# Patient Record
Sex: FEMALE | Race: WHITE | Employment: UNEMPLOYED | URBAN - METROPOLITAN AREA
[De-identification: names, ages, dates, MRNs, and addresses within clinical notes are randomized per-mention and may not be internally consistent; named-entity substitution may affect disease eponyms.]

---

## 2022-12-05 ENCOUNTER — HOSPITAL ENCOUNTER (OUTPATIENT)
Dept: WOMENS IMAGING | Age: 72
Discharge: HOME | End: 2022-12-05

## 2023-07-25 PROBLEM — R06.02 SHORT OF BREATH ON EXERTION: Status: ACTIVE | Noted: 2023-07-25

## 2023-07-25 PROBLEM — M75.41 IMPINGEMENT SYNDROME OF RIGHT SHOULDER: Status: ACTIVE | Noted: 2023-07-25

## 2023-07-25 PROBLEM — M19.90 DJD (DEGENERATIVE JOINT DISEASE): Status: ACTIVE | Noted: 2023-07-25

## 2023-07-25 PROBLEM — D22.9 BENIGN MOLE: Status: ACTIVE | Noted: 2023-07-25

## 2023-07-25 PROBLEM — I21.9 MYOCARDIAL INFARCTION (MULTI): Status: ACTIVE | Noted: 2023-07-25

## 2023-07-25 PROBLEM — E55.9 VITAMIN D DEFICIENCY: Status: ACTIVE | Noted: 2023-07-25

## 2023-07-25 PROBLEM — I25.10 ATHSCL HEART DISEASE OF NATIVE CORONARY ARTERY W/O ANG PCTRS: Status: ACTIVE | Noted: 2023-07-25

## 2023-07-25 PROBLEM — M19.90 ARTHRITIS: Status: ACTIVE | Noted: 2023-07-25

## 2023-07-25 PROBLEM — I10 HTN (HYPERTENSION), BENIGN: Status: ACTIVE | Noted: 2023-07-25

## 2023-07-25 PROBLEM — M81.0 OSTEOPOROSIS: Status: ACTIVE | Noted: 2023-07-25

## 2023-07-25 PROBLEM — E04.9 THYROID GOITER: Status: ACTIVE | Noted: 2023-07-25

## 2023-07-25 PROBLEM — R07.9 CHEST PAIN: Status: ACTIVE | Noted: 2023-07-25

## 2023-07-25 PROBLEM — I25.2 H/O MYOCARDIAL INFARCTION, GREATER THAN 8 WEEKS: Status: ACTIVE | Noted: 2023-07-25

## 2023-07-25 PROBLEM — E78.00 PURE HYPERCHOLESTEROLEMIA: Status: ACTIVE | Noted: 2023-07-25

## 2023-07-25 PROBLEM — E78.5 HYPERLIPIDEMIA: Status: ACTIVE | Noted: 2023-07-25

## 2023-07-25 RX ORDER — AMOXICILLIN 500 MG
CAPSULE ORAL
COMMUNITY

## 2023-07-25 RX ORDER — ASPIRIN 81 MG/1
1 TABLET ORAL DAILY
COMMUNITY

## 2023-07-25 RX ORDER — ALENDRONATE SODIUM 70 MG/1
1 TABLET ORAL
COMMUNITY
Start: 2018-04-17 | End: 2023-10-20 | Stop reason: SDUPTHER

## 2023-07-25 RX ORDER — CHOLECALCIFEROL (VITAMIN D3) 25 MCG
1 TABLET ORAL DAILY
COMMUNITY

## 2023-07-25 RX ORDER — CALCIUM CARBONATE 600 MG
1 TABLET ORAL DAILY
COMMUNITY
Start: 2015-10-22

## 2023-07-25 RX ORDER — LISINOPRIL 2.5 MG/1
1 TABLET ORAL DAILY
COMMUNITY
Start: 2018-04-16 | End: 2023-10-03 | Stop reason: SDUPTHER

## 2023-07-25 RX ORDER — SIMVASTATIN 80 MG/1
0.5 TABLET, FILM COATED ORAL DAILY
COMMUNITY
Start: 2014-03-19 | End: 2024-04-03 | Stop reason: SDUPTHER

## 2023-07-25 RX ORDER — PETROLATUM,WHITE/LANOLIN
1 OINTMENT (GRAM) TOPICAL 2 TIMES DAILY
COMMUNITY

## 2023-07-25 RX ORDER — VIT C/E/ZN/COPPR/LUTEIN/ZEAXAN 250MG-90MG
CAPSULE ORAL
COMMUNITY
Start: 2015-10-22

## 2023-07-25 RX ORDER — SIMVASTATIN 40 MG/1
TABLET, FILM COATED ORAL
COMMUNITY
End: 2023-08-03 | Stop reason: SDUPTHER

## 2023-07-25 RX ORDER — METOPROLOL SUCCINATE 25 MG/1
TABLET, EXTENDED RELEASE ORAL
COMMUNITY
Start: 2014-07-21 | End: 2024-04-03 | Stop reason: SDUPTHER

## 2023-07-27 LAB
ALANINE AMINOTRANSFERASE (SGPT) (U/L) IN SER/PLAS: 17 U/L (ref 7–45)
ALBUMIN (G/DL) IN SER/PLAS: 4.5 G/DL (ref 3.4–5)
ALKALINE PHOSPHATASE (U/L) IN SER/PLAS: 50 U/L (ref 33–136)
ANION GAP IN SER/PLAS: 11 MMOL/L (ref 10–20)
ASPARTATE AMINOTRANSFERASE (SGOT) (U/L) IN SER/PLAS: 20 U/L (ref 9–39)
BILIRUBIN TOTAL (MG/DL) IN SER/PLAS: 1.4 MG/DL (ref 0–1.2)
CALCIUM (MG/DL) IN SER/PLAS: 9.7 MG/DL (ref 8.6–10.3)
CARBON DIOXIDE, TOTAL (MMOL/L) IN SER/PLAS: 29 MMOL/L (ref 21–32)
CHLORIDE (MMOL/L) IN SER/PLAS: 105 MMOL/L (ref 98–107)
CHOLESTEROL (MG/DL) IN SER/PLAS: 135 MG/DL (ref 0–199)
CHOLESTEROL IN HDL (MG/DL) IN SER/PLAS: 52.1 MG/DL
CHOLESTEROL/HDL RATIO: 2.6
CREATININE (MG/DL) IN SER/PLAS: 0.85 MG/DL (ref 0.5–1.05)
ERYTHROCYTE DISTRIBUTION WIDTH (RATIO) BY AUTOMATED COUNT: 12.6 % (ref 11.5–14.5)
ERYTHROCYTE MEAN CORPUSCULAR HEMOGLOBIN CONCENTRATION (G/DL) BY AUTOMATED: 32 G/DL (ref 32–36)
ERYTHROCYTE MEAN CORPUSCULAR VOLUME (FL) BY AUTOMATED COUNT: 93 FL (ref 80–100)
ERYTHROCYTES (10*6/UL) IN BLOOD BY AUTOMATED COUNT: 4.71 X10E12/L (ref 4–5.2)
GFR FEMALE: 72 ML/MIN/1.73M2
GLUCOSE (MG/DL) IN SER/PLAS: 124 MG/DL (ref 74–99)
HEMATOCRIT (%) IN BLOOD BY AUTOMATED COUNT: 43.8 % (ref 36–46)
HEMOGLOBIN (G/DL) IN BLOOD: 14 G/DL (ref 12–16)
LDL: 51 MG/DL (ref 0–99)
LEUKOCYTES (10*3/UL) IN BLOOD BY AUTOMATED COUNT: 6.4 X10E9/L (ref 4.4–11.3)
NRBC (PER 100 WBCS) BY AUTOMATED COUNT: 0 /100 WBC (ref 0–0)
PLATELETS (10*3/UL) IN BLOOD AUTOMATED COUNT: 205 X10E9/L (ref 150–450)
POTASSIUM (MMOL/L) IN SER/PLAS: 4 MMOL/L (ref 3.5–5.3)
PROTEIN TOTAL: 6.6 G/DL (ref 6.4–8.2)
SODIUM (MMOL/L) IN SER/PLAS: 141 MMOL/L (ref 136–145)
TRIGLYCERIDE (MG/DL) IN SER/PLAS: 160 MG/DL (ref 0–149)
UREA NITROGEN (MG/DL) IN SER/PLAS: 17 MG/DL (ref 6–23)
VLDL: 32 MG/DL (ref 0–40)

## 2023-08-03 ENCOUNTER — OFFICE VISIT (OUTPATIENT)
Dept: PRIMARY CARE | Facility: CLINIC | Age: 73
End: 2023-08-03
Payer: MEDICARE

## 2023-08-03 VITALS
HEART RATE: 60 BPM | DIASTOLIC BLOOD PRESSURE: 80 MMHG | BODY MASS INDEX: 27.53 KG/M2 | WEIGHT: 149.6 LBS | HEIGHT: 62 IN | OXYGEN SATURATION: 98 % | SYSTOLIC BLOOD PRESSURE: 122 MMHG | TEMPERATURE: 97.8 F

## 2023-08-03 DIAGNOSIS — E78.5 HYPERLIPIDEMIA, UNSPECIFIED HYPERLIPIDEMIA TYPE: ICD-10-CM

## 2023-08-03 DIAGNOSIS — I10 HTN (HYPERTENSION), BENIGN: ICD-10-CM

## 2023-08-03 DIAGNOSIS — Z00.00 MEDICARE ANNUAL WELLNESS VISIT, SUBSEQUENT: Primary | ICD-10-CM

## 2023-08-03 DIAGNOSIS — R73.9 HYPERGLYCEMIA: ICD-10-CM

## 2023-08-03 DIAGNOSIS — I50.9 HEART FAILURE, UNSPECIFIED HF CHRONICITY, UNSPECIFIED HEART FAILURE TYPE (MULTI): ICD-10-CM

## 2023-08-03 DIAGNOSIS — Z12.31 ENCOUNTER FOR SCREENING MAMMOGRAM FOR MALIGNANT NEOPLASM OF BREAST: ICD-10-CM

## 2023-08-03 LAB — HBA1C MFR BLD: 5.7 % (ref 4.2–6.5)

## 2023-08-03 PROCEDURE — 3074F SYST BP LT 130 MM HG: CPT | Performed by: FAMILY MEDICINE

## 2023-08-03 PROCEDURE — 1158F ADVNC CARE PLAN TLK DOCD: CPT | Performed by: FAMILY MEDICINE

## 2023-08-03 PROCEDURE — 1170F FXNL STATUS ASSESSED: CPT | Performed by: FAMILY MEDICINE

## 2023-08-03 PROCEDURE — 1126F AMNT PAIN NOTED NONE PRSNT: CPT | Performed by: FAMILY MEDICINE

## 2023-08-03 PROCEDURE — 83036 HEMOGLOBIN GLYCOSYLATED A1C: CPT | Performed by: FAMILY MEDICINE

## 2023-08-03 PROCEDURE — 3079F DIAST BP 80-89 MM HG: CPT | Performed by: FAMILY MEDICINE

## 2023-08-03 PROCEDURE — 99497 ADVNCD CARE PLAN 30 MIN: CPT | Performed by: FAMILY MEDICINE

## 2023-08-03 PROCEDURE — G0439 PPPS, SUBSEQ VISIT: HCPCS | Performed by: FAMILY MEDICINE

## 2023-08-03 PROCEDURE — 3008F BODY MASS INDEX DOCD: CPT | Performed by: FAMILY MEDICINE

## 2023-08-03 PROCEDURE — 99397 PER PM REEVAL EST PAT 65+ YR: CPT | Performed by: FAMILY MEDICINE

## 2023-08-03 PROCEDURE — 1036F TOBACCO NON-USER: CPT | Performed by: FAMILY MEDICINE

## 2023-08-03 PROCEDURE — 1160F RVW MEDS BY RX/DR IN RCRD: CPT | Performed by: FAMILY MEDICINE

## 2023-08-03 PROCEDURE — 1159F MED LIST DOCD IN RCRD: CPT | Performed by: FAMILY MEDICINE

## 2023-08-03 SDOH — ECONOMIC STABILITY: FOOD INSECURITY: WITHIN THE PAST 12 MONTHS, YOU WORRIED THAT YOUR FOOD WOULD RUN OUT BEFORE YOU GOT MONEY TO BUY MORE.: NEVER TRUE

## 2023-08-03 SDOH — ECONOMIC STABILITY: FOOD INSECURITY: WITHIN THE PAST 12 MONTHS, THE FOOD YOU BOUGHT JUST DIDN'T LAST AND YOU DIDN'T HAVE MONEY TO GET MORE.: NEVER TRUE

## 2023-08-03 SDOH — ECONOMIC STABILITY: TRANSPORTATION INSECURITY
IN THE PAST 12 MONTHS, HAS LACK OF TRANSPORTATION KEPT YOU FROM MEETINGS, WORK, OR FROM GETTING THINGS NEEDED FOR DAILY LIVING?: NO

## 2023-08-03 SDOH — ECONOMIC STABILITY: HOUSING INSECURITY
IN THE LAST 12 MONTHS, WAS THERE A TIME WHEN YOU DID NOT HAVE A STEADY PLACE TO SLEEP OR SLEPT IN A SHELTER (INCLUDING NOW)?: NO

## 2023-08-03 SDOH — ECONOMIC STABILITY: INCOME INSECURITY: IN THE LAST 12 MONTHS, WAS THERE A TIME WHEN YOU WERE NOT ABLE TO PAY THE MORTGAGE OR RENT ON TIME?: NO

## 2023-08-03 SDOH — ECONOMIC STABILITY: TRANSPORTATION INSECURITY
IN THE PAST 12 MONTHS, HAS THE LACK OF TRANSPORTATION KEPT YOU FROM MEDICAL APPOINTMENTS OR FROM GETTING MEDICATIONS?: NO

## 2023-08-03 ASSESSMENT — GERIATRIC MINI NUTRITIONAL ASSESSMENT (MNA)
B WEIGHT LOSS DURING THE LAST 3 MONTHS: NO WEIGHT LOSS
C GENERAL MOBILITY: GOES OUT
A HAS FOOD INTAKE DECLINED OVER THE PAST 3 MONTHS DUE TO LOSS OF APPETITE, DIGESTIVE PROBLEMS, CHEWING OR SWALLOWING DIFFICULTIES?: NO DECREASE IN FOOD INTAKE
E NEUROPSYCHOLOGICAL PROBLEMS: NO PSYCHOLOGICAL PROBLEMS
D HAS SUFFERED PSYCHOLOGICAL STRESS OR ACUTE DISEASE IN THE PAST 3 MONTHS?: NO

## 2023-08-03 ASSESSMENT — ACTIVITIES OF DAILY LIVING (ADL)
TAKING MEDICATION: INDEPENDENT
DOING HOUSEWORK: INDEPENDENT
TOILETING: INDEPENDENT
WALKS IN HOME: INDEPENDENT
PREPARING MEALS: INDEPENDENT
ADEQUATE_TO_COMPLETE_ADL: YES
EATING: INDEPENDENT
MANAGING FINANCES: INDEPENDENT
USING TRANSPORTATION: INDEPENDENT
DRESSING YOURSELF: INDEPENDENT
PATIENT'S MEMORY ADEQUATE TO SAFELY COMPLETE DAILY ACTIVITIES?: YES
BATHING: INDEPENDENT
FEEDING YOURSELF: INDEPENDENT
STIL DRIVING: YES
GROCERY SHOPPING: INDEPENDENT
GROOMING: INDEPENDENT
USING TELEPHONE: INDEPENDENT
JUDGMENT_ADEQUATE_SAFELY_COMPLETE_DAILY_ACTIVITIES: YES

## 2023-08-03 ASSESSMENT — ENCOUNTER SYMPTOMS
RESPIRATORY NEGATIVE: 1
ARTHRALGIAS: 1
NEUROLOGICAL NEGATIVE: 1
CONFUSION: 0
CONSTITUTIONAL NEGATIVE: 1
OCCASIONAL FEELINGS OF UNSTEADINESS: 0
ENDOCRINE COMMENTS: THYROID NODULES
PSYCHIATRIC NEGATIVE: 1
LOSS OF SENSATION IN FEET: 0
DEPRESSION: 0

## 2023-08-03 ASSESSMENT — PATIENT HEALTH QUESTIONNAIRE - PHQ9
2. FEELING DOWN, DEPRESSED OR HOPELESS: NOT AT ALL
1. LITTLE INTEREST OR PLEASURE IN DOING THINGS: NOT AT ALL
SUM OF ALL RESPONSES TO PHQ9 QUESTIONS 1 & 2: 0

## 2023-08-03 ASSESSMENT — ANXIETY QUESTIONNAIRES
7. FEELING AFRAID AS IF SOMETHING AWFUL MIGHT HAPPEN: NOT AT ALL
3. WORRYING TOO MUCH ABOUT DIFFERENT THINGS: NOT AT ALL
2. NOT BEING ABLE TO STOP OR CONTROL WORRYING: NOT AT ALL
1. FEELING NERVOUS, ANXIOUS, OR ON EDGE: NOT AT ALL
IF YOU CHECKED OFF ANY PROBLEMS ON THIS QUESTIONNAIRE, HOW DIFFICULT HAVE THESE PROBLEMS MADE IT FOR YOU TO DO YOUR WORK, TAKE CARE OF THINGS AT HOME, OR GET ALONG WITH OTHER PEOPLE: NOT DIFFICULT AT ALL
5. BEING SO RESTLESS THAT IT IS HARD TO SIT STILL: NOT AT ALL
GAD7 TOTAL SCORE: 0
4. TROUBLE RELAXING: NOT AT ALL
6. BECOMING EASILY ANNOYED OR IRRITABLE: NOT AT ALL

## 2023-08-03 ASSESSMENT — SOCIAL DETERMINANTS OF HEALTH (SDOH)
HOW HARD IS IT FOR YOU TO PAY FOR THE VERY BASICS LIKE FOOD, HOUSING, MEDICAL CARE, AND HEATING?: NOT HARD AT ALL
IN THE PAST 12 MONTHS, HAS THE ELECTRIC, GAS, OIL, OR WATER COMPANY THREATENED TO SHUT OFF SERVICE IN YOUR HOME?: NO
WITHIN THE LAST YEAR, HAVE YOU BEEN KICKED, HIT, SLAPPED, OR OTHERWISE PHYSICALLY HURT BY YOUR PARTNER OR EX-PARTNER?: NO
WITHIN THE LAST YEAR, HAVE TO BEEN RAPED OR FORCED TO HAVE ANY KIND OF SEXUAL ACTIVITY BY YOUR PARTNER OR EX-PARTNER?: NO
WITHIN THE LAST YEAR, HAVE YOU BEEN HUMILIATED OR EMOTIONALLY ABUSED IN OTHER WAYS BY YOUR PARTNER OR EX-PARTNER?: NO
WITHIN THE LAST YEAR, HAVE YOU BEEN AFRAID OF YOUR PARTNER OR EX-PARTNER?: NO

## 2023-08-03 ASSESSMENT — LIFESTYLE VARIABLES
HOW OFTEN DO YOU HAVE SIX OR MORE DRINKS ON ONE OCCASION: NEVER
HOW MANY STANDARD DRINKS CONTAINING ALCOHOL DO YOU HAVE ON A TYPICAL DAY: PATIENT DOES NOT DRINK
HOW OFTEN DO YOU HAVE A DRINK CONTAINING ALCOHOL: NEVER
SKIP TO QUESTIONS 9-10: 1
AUDIT-C TOTAL SCORE: 0

## 2023-08-03 ASSESSMENT — PAIN SCALES - GENERAL: PAINLEVEL: 0-NO PAIN

## 2023-08-03 NOTE — PROGRESS NOTES
"Subjective   Patient ID: Marisela Fernández is a 73 y.o. female who presents for Annual Exam (Annual medicare wellness fasting bw).    HPI     Review of Systems   Constitutional: Negative.    HENT: Negative.     Respiratory: Negative.     Cardiovascular:         Dr Gatica   Gastrointestinal:         Colonoscopy due 2024   Endocrine:        Thyroid nodules   Genitourinary: Negative.    Musculoskeletal:  Positive for arthralgias.   Neurological: Negative.    Psychiatric/Behavioral: Negative.  Negative for confusion.        Objective   /80 (BP Location: Left arm)   Pulse 60   Temp 36.6 °C (97.8 °F) (Temporal)   Ht 1.575 m (5' 2\")   Wt 67.9 kg (149 lb 9.6 oz)   SpO2 98%   BMI 27.36 kg/m²     Physical Exam  Vitals and nursing note reviewed.   HENT:      Right Ear: Tympanic membrane normal.      Left Ear: Tympanic membrane normal.      Mouth/Throat:      Pharynx: Oropharynx is clear.   Cardiovascular:      Rate and Rhythm: Normal rate and regular rhythm.      Pulses: Normal pulses.      Heart sounds: Normal heart sounds.   Pulmonary:      Breath sounds: Normal breath sounds.   Abdominal:      Palpations: Abdomen is soft.   Musculoskeletal:      Comments: Status post left total knee replacement February 2022   Neurological:      General: No focal deficit present.      Mental Status: She is alert and oriented to person, place, and time.   Psychiatric:         Mood and Affect: Mood normal.         Behavior: Behavior normal.         Assessment/Plan   Problem List Items Addressed This Visit       HTN (hypertension), benign    Hyperlipidemia    Heart failure, unspecified HF chronicity, unspecified heart failure type (CMS/[facility])    Relevant Medications    metoprolol succinate XL (Toprol-XL) 25 mg 24 hr tablet     Other Visit Diagnoses       Medicare annual wellness visit, subsequent    -  Primary    BMI 27.0-27.9,adult        Hyperglycemia        Relevant Orders    POCT Glycosylated Hemoglobin (HGB A1C) docked device "    Encounter for screening mammogram for malignant neoplasm of breast        Relevant Orders    BI mammo bilateral screening tomosynthesis

## 2023-08-03 NOTE — ACP (ADVANCE CARE PLANNING)
Advance Care Planning Note     Discussion Date: 08/03/23   Discussion Participants: patient    The patient wishes to discuss Advance Care Planning today and the following is a brief summary of our discussion.     Patient has capacity to make their own medical decisions: Yes  Health Care Agent/Surrogate Decision Maker documented in chart: Yes    Documents on file and valid:  Advance Directive/Living Will: Yes   Health Care Power of : Yes  Other: none    Communication of Medical Status/Prognosis:   yes     Communication of Treatment Goals/Options:   no     Treatment Decisions  Goals of Care: survival is paramount regardless of prognosis, treatment outcome, or burden   yes  Follow Up Plan  no  Team Members  myself  Time Statement: Total face to face time spent on advance care planning was 16 minutes with 16 minutes spent in counseling, including the explanation.    George Avina DO  8/3/2023 9:50 AM

## 2023-09-19 DIAGNOSIS — I25.10 CORONARY ARTERY DISEASE INVOLVING NATIVE CORONARY ARTERY OF NATIVE HEART WITHOUT ANGINA PECTORIS: ICD-10-CM

## 2023-09-19 DIAGNOSIS — I42.2 CARDIOMYOPATHY, HYPERTROPHIC (MULTI): ICD-10-CM

## 2023-10-03 DIAGNOSIS — I10 HTN (HYPERTENSION), BENIGN: Primary | ICD-10-CM

## 2023-10-05 RX ORDER — LISINOPRIL 2.5 MG/1
2.5 TABLET ORAL DAILY
Qty: 90 TABLET | Refills: 3 | Status: SHIPPED | OUTPATIENT
Start: 2023-10-05

## 2023-10-19 ENCOUNTER — HOSPITAL ENCOUNTER (OUTPATIENT)
Dept: CARDIOLOGY | Facility: CLINIC | Age: 73
Discharge: HOME | End: 2023-10-19
Payer: MEDICARE

## 2023-10-19 VITALS
SYSTOLIC BLOOD PRESSURE: 122 MMHG | DIASTOLIC BLOOD PRESSURE: 80 MMHG | HEIGHT: 62 IN | BODY MASS INDEX: 27.42 KG/M2 | WEIGHT: 149 LBS

## 2023-10-19 DIAGNOSIS — I42.2 OTHER HYPERTROPHIC CARDIOMYOPATHY (MULTI): ICD-10-CM

## 2023-10-19 DIAGNOSIS — I25.10 ATHEROSCLEROTIC HEART DISEASE OF NATIVE CORONARY ARTERY WITHOUT ANGINA PECTORIS: ICD-10-CM

## 2023-10-19 DIAGNOSIS — Z84.89 FAMILY HISTORY OF OTHER SPECIFIED CONDITIONS: ICD-10-CM

## 2023-10-19 LAB — EJECTION FRACTION APICAL 4 CHAMBER: 64.4

## 2023-10-19 PROCEDURE — 93306 TTE W/DOPPLER COMPLETE: CPT | Performed by: INTERNAL MEDICINE

## 2023-10-19 PROCEDURE — 93306 TTE W/DOPPLER COMPLETE: CPT

## 2023-10-20 DIAGNOSIS — M81.0 AGE-RELATED OSTEOPOROSIS WITHOUT CURRENT PATHOLOGICAL FRACTURE: Primary | ICD-10-CM

## 2023-10-20 RX ORDER — ALENDRONATE SODIUM 70 MG/1
70 TABLET ORAL
Qty: 12 TABLET | Refills: 3 | Status: SHIPPED | OUTPATIENT
Start: 2023-10-20

## 2024-01-27 ENCOUNTER — LAB (OUTPATIENT)
Dept: LAB | Facility: LAB | Age: 74
End: 2024-01-27
Payer: MEDICARE

## 2024-01-27 DIAGNOSIS — E78.5 HYPERLIPIDEMIA, UNSPECIFIED: ICD-10-CM

## 2024-01-27 DIAGNOSIS — I25.10 ATHEROSCLEROTIC HEART DISEASE OF NATIVE CORONARY ARTERY WITHOUT ANGINA PECTORIS: Primary | ICD-10-CM

## 2024-01-27 LAB
ALBUMIN SERPL BCP-MCNC: 4.5 G/DL (ref 3.4–5)
ALP SERPL-CCNC: 47 U/L (ref 33–136)
ALT SERPL W P-5'-P-CCNC: 16 U/L (ref 7–45)
ANION GAP SERPL CALC-SCNC: 13 MMOL/L (ref 10–20)
AST SERPL W P-5'-P-CCNC: 19 U/L (ref 9–39)
BILIRUB SERPL-MCNC: 1.4 MG/DL (ref 0–1.2)
BUN SERPL-MCNC: 16 MG/DL (ref 6–23)
CALCIUM SERPL-MCNC: 9.7 MG/DL (ref 8.6–10.3)
CHLORIDE SERPL-SCNC: 104 MMOL/L (ref 98–107)
CHOLEST SERPL-MCNC: 160 MG/DL (ref 0–199)
CHOLESTEROL/HDL RATIO: 2.5
CO2 SERPL-SCNC: 30 MMOL/L (ref 21–32)
CREAT SERPL-MCNC: 0.82 MG/DL (ref 0.5–1.05)
EGFRCR SERPLBLD CKD-EPI 2021: 76 ML/MIN/1.73M*2
ERYTHROCYTE [DISTWIDTH] IN BLOOD BY AUTOMATED COUNT: 13.1 % (ref 11.5–14.5)
GLUCOSE SERPL-MCNC: 99 MG/DL (ref 74–99)
HCT VFR BLD AUTO: 45.3 % (ref 36–46)
HDLC SERPL-MCNC: 63.3 MG/DL
HGB BLD-MCNC: 14.7 G/DL (ref 12–16)
LDLC SERPL CALC-MCNC: 62 MG/DL
MCH RBC QN AUTO: 30.6 PG (ref 26–34)
MCHC RBC AUTO-ENTMCNC: 32.5 G/DL (ref 32–36)
MCV RBC AUTO: 94 FL (ref 80–100)
NON HDL CHOLESTEROL: 97 MG/DL (ref 0–149)
NRBC BLD-RTO: 0 /100 WBCS (ref 0–0)
PLATELET # BLD AUTO: 199 X10*3/UL (ref 150–450)
POTASSIUM SERPL-SCNC: 4.5 MMOL/L (ref 3.5–5.3)
PROT SERPL-MCNC: 6.5 G/DL (ref 6.4–8.2)
RBC # BLD AUTO: 4.8 X10*6/UL (ref 4–5.2)
SODIUM SERPL-SCNC: 142 MMOL/L (ref 136–145)
TRIGL SERPL-MCNC: 172 MG/DL (ref 0–149)
VLDL: 34 MG/DL (ref 0–40)
WBC # BLD AUTO: 7.5 X10*3/UL (ref 4.4–11.3)

## 2024-01-27 PROCEDURE — 80061 LIPID PANEL: CPT

## 2024-01-27 PROCEDURE — 80053 COMPREHEN METABOLIC PANEL: CPT

## 2024-01-27 PROCEDURE — 85027 COMPLETE CBC AUTOMATED: CPT

## 2024-01-27 PROCEDURE — 36415 COLL VENOUS BLD VENIPUNCTURE: CPT

## 2024-01-31 PROBLEM — M75.40 IMPINGEMENT SYNDROME OF SHOULDER REGION: Status: ACTIVE | Noted: 2024-01-31

## 2024-01-31 PROBLEM — M19.90 ARTHRITIS: Status: RESOLVED | Noted: 2023-07-25 | Resolved: 2024-01-31

## 2024-01-31 PROBLEM — M19.90 OSTEOARTHRITIS: Status: ACTIVE | Noted: 2023-03-25

## 2024-01-31 PROBLEM — R07.9 CHEST PAIN: Status: RESOLVED | Noted: 2023-07-25 | Resolved: 2024-01-31

## 2024-01-31 PROBLEM — M75.41 IMPINGEMENT SYNDROME OF RIGHT SHOULDER: Status: RESOLVED | Noted: 2023-07-25 | Resolved: 2024-01-31

## 2024-01-31 PROBLEM — E04.9 THYROID GOITER: Status: RESOLVED | Noted: 2023-07-25 | Resolved: 2024-01-31

## 2024-01-31 PROBLEM — I21.9 MYOCARDIAL INFARCTION (MULTI): Status: RESOLVED | Noted: 2023-07-25 | Resolved: 2024-01-31

## 2024-01-31 PROBLEM — E78.00 PURE HYPERCHOLESTEROLEMIA: Status: RESOLVED | Noted: 2023-07-25 | Resolved: 2024-01-31

## 2024-01-31 PROBLEM — M25.579 ANKLE PAIN: Status: ACTIVE | Noted: 2024-01-31

## 2024-01-31 PROBLEM — I42.2 HYPERTROPHIC CARDIOMYOPATHY (MULTI): Status: ACTIVE | Noted: 2024-01-31

## 2024-01-31 PROBLEM — D22.9 BENIGN MOLE: Status: RESOLVED | Noted: 2023-07-25 | Resolved: 2024-01-31

## 2024-02-06 ENCOUNTER — OFFICE VISIT (OUTPATIENT)
Dept: CARDIOLOGY | Facility: CLINIC | Age: 74
End: 2024-02-06
Payer: MEDICARE

## 2024-02-06 VITALS
HEIGHT: 62 IN | SYSTOLIC BLOOD PRESSURE: 140 MMHG | HEART RATE: 60 BPM | WEIGHT: 143.6 LBS | DIASTOLIC BLOOD PRESSURE: 80 MMHG | BODY MASS INDEX: 26.43 KG/M2 | OXYGEN SATURATION: 97 %

## 2024-02-06 DIAGNOSIS — E78.5 HYPERLIPIDEMIA, UNSPECIFIED HYPERLIPIDEMIA TYPE: ICD-10-CM

## 2024-02-06 DIAGNOSIS — I25.10 ATHSCL HEART DISEASE OF NATIVE CORONARY ARTERY W/O ANG PCTRS: Primary | ICD-10-CM

## 2024-02-06 DIAGNOSIS — Z96.652 H/O TOTAL KNEE REPLACEMENT, LEFT: ICD-10-CM

## 2024-02-06 DIAGNOSIS — I10 HTN (HYPERTENSION), BENIGN: ICD-10-CM

## 2024-02-06 DIAGNOSIS — I25.2 H/O MYOCARDIAL INFARCTION, GREATER THAN 8 WEEKS: ICD-10-CM

## 2024-02-06 DIAGNOSIS — I42.2 HYPERTROPHIC CARDIOMYOPATHY (MULTI): ICD-10-CM

## 2024-02-06 PROCEDURE — 99214 OFFICE O/P EST MOD 30 MIN: CPT | Performed by: INTERNAL MEDICINE

## 2024-02-06 PROCEDURE — 3079F DIAST BP 80-89 MM HG: CPT | Performed by: INTERNAL MEDICINE

## 2024-02-06 PROCEDURE — 1160F RVW MEDS BY RX/DR IN RCRD: CPT | Performed by: INTERNAL MEDICINE

## 2024-02-06 PROCEDURE — 3077F SYST BP >= 140 MM HG: CPT | Performed by: INTERNAL MEDICINE

## 2024-02-06 PROCEDURE — 1126F AMNT PAIN NOTED NONE PRSNT: CPT | Performed by: INTERNAL MEDICINE

## 2024-02-06 PROCEDURE — 1159F MED LIST DOCD IN RCRD: CPT | Performed by: INTERNAL MEDICINE

## 2024-02-06 PROCEDURE — 3008F BODY MASS INDEX DOCD: CPT | Performed by: INTERNAL MEDICINE

## 2024-02-06 PROCEDURE — 1036F TOBACCO NON-USER: CPT | Performed by: INTERNAL MEDICINE

## 2024-02-06 NOTE — PROGRESS NOTES
"  Subjective  Marisela Fernández  is a 73 y.o. year old female who presents for ASHD,  Refuse EKG today.  No dyspnea, no barbi pain, no palaitions    Blood pressure 140/80, pulse 60, height 1.575 m (5' 2\"), weight 65.1 kg (143 lb 9.6 oz), SpO2 97 %.   Codeine and Dextromethorphan cough  History reviewed. No pertinent past medical history.  Past Surgical History:   Procedure Laterality Date    COLONOSCOPY  10/22/2015    Complete Colonoscopy    HYSTERECTOMY  04/07/2015    Hysterectomy    OTHER SURGICAL HISTORY  04/07/2015    Corneal LASIK Bilateral    OTHER SURGICAL HISTORY  04/07/2015    Transcath Placement Of Intrathoracic Carotid Artery Stent    OTHER SURGICAL HISTORY  05/19/2020    Previous Stent Placement    TONSILLECTOMY  02/13/2018    Tonsillectomy With Adenoidectomy     No family history on file.  @SOC    Current Outpatient Medications   Medication Sig Dispense Refill    alendronate (Fosamax) 70 mg tablet Take 1 tablet (70 mg) by mouth 1 (one) time per week. 12 tablet 3    aspirin 81 mg EC tablet Take 1 tablet (81 mg) by mouth once daily.      calcium carbonate 600 mg calcium (1,500 mg) tablet Take 1 tablet (600 mg) by mouth once daily.      cholecalciferol (Vitamin D-3) 25 MCG (1000 UT) capsule Take by mouth.      cholecalciferol (Vitamin D-3) 25 MCG (1000 UT) tablet Take 1 tablet (25 mcg) by mouth once daily.      glucosamine sulfate 500 mg capsule Take 1 capsule by mouth 2 times a day.      lisinopril 2.5 mg tablet Take 1 tablet (2.5 mg) by mouth once daily. 90 tablet 3    metoprolol succinate XL (Toprol-XL) 25 mg 24 hr tablet Take by mouth.      omega-3 fatty acids-fish oil 300-1,000 mg capsule Take by mouth.      simvastatin (Zocor) 80 mg tablet Take 0.5 tablets (40 mg) by mouth once daily.      vit A/vit C/vit E/zinc/copper (PRESERVISION AREDS ORAL) Take by mouth.       No current facility-administered medications for this visit.        ROS  Review of Systems   All other systems reviewed and are " negative.      Physical Exam  Physical Exam  Constitutional:       Appearance: Normal appearance.   HENT:      Head: Normocephalic and atraumatic.   Eyes:      Extraocular Movements: Extraocular movements intact.      Pupils: Pupils are equal, round, and reactive to light.   Cardiovascular:      Rate and Rhythm: Normal rate and regular rhythm.      Heart sounds: S1 normal and S2 normal.   Pulmonary:      Effort: Pulmonary effort is normal.      Breath sounds: Normal breath sounds.   Abdominal:      General: Abdomen is flat.   Musculoskeletal:      Right lower leg: No edema.      Left lower leg: No edema.   Skin:     General: Skin is warm and dry.   Neurological:      General: No focal deficit present.      Mental Status: She is alert and oriented to person, place, and time.   Psychiatric:         Mood and Affect: Mood normal.         Behavior: Behavior normal.          EKG  No results found for this or any previous visit (from the past 4464 hour(s)).    Problem List Items Addressed This Visit       Athscl heart disease of native coronary artery w/o ang pctrs - Primary    HTN (hypertension), benign    Hyperlipidemia     1/27/24 Tchol = 160. HDL = 63.3, LDL = 62, Trig = 172         H/O myocardial infarction, greater than 8 weeks     6/4/20 normal exercise thallium to7.00 METS max. ETT         Hypertrophic cardiomyopathy (CMS/HCC)     10/19/23 echocardiogram moderate ALPHONSO         Relevant Orders    Follow Up In Cardiology    Transthoracic Echo (TTE) Complete    H/O total knee replacement, left     2/24/22            Return 6 months with EKG and echocardiogram a few days prior to return        Julius Gatica MD

## 2024-02-07 PROBLEM — Z96.652 H/O TOTAL KNEE REPLACEMENT, LEFT: Status: ACTIVE | Noted: 2024-02-07

## 2024-04-01 DIAGNOSIS — I25.10 ATHSCL HEART DISEASE OF NATIVE CORONARY ARTERY W/O ANG PCTRS: Primary | ICD-10-CM

## 2024-04-01 DIAGNOSIS — I10 HTN (HYPERTENSION), BENIGN: ICD-10-CM

## 2024-04-03 RX ORDER — METOPROLOL SUCCINATE 25 MG/1
TABLET, EXTENDED RELEASE ORAL 2 TIMES DAILY
Qty: 90 TABLET | Refills: 3 | Status: SHIPPED | OUTPATIENT
Start: 2024-04-03

## 2024-04-03 RX ORDER — SIMVASTATIN 80 MG/1
40 TABLET, FILM COATED ORAL DAILY
Qty: 45 TABLET | Refills: 3 | Status: SHIPPED | OUTPATIENT
Start: 2024-04-03

## 2024-07-22 ENCOUNTER — PATIENT MESSAGE (OUTPATIENT)
Dept: CARDIOLOGY | Facility: CLINIC | Age: 74
End: 2024-07-22
Payer: MEDICARE

## 2024-07-22 DIAGNOSIS — I25.10 ATHSCL HEART DISEASE OF NATIVE CORONARY ARTERY W/O ANG PCTRS: ICD-10-CM

## 2024-07-27 ENCOUNTER — LAB (OUTPATIENT)
Dept: LAB | Facility: LAB | Age: 74
End: 2024-07-27
Payer: MEDICARE

## 2024-07-27 DIAGNOSIS — I25.10 ATHSCL HEART DISEASE OF NATIVE CORONARY ARTERY W/O ANG PCTRS: ICD-10-CM

## 2024-07-27 LAB
ALBUMIN SERPL BCP-MCNC: 4.6 G/DL (ref 3.4–5)
ALP SERPL-CCNC: 50 U/L (ref 33–136)
ALT SERPL W P-5'-P-CCNC: 13 U/L (ref 7–45)
ANION GAP SERPL CALC-SCNC: 13 MMOL/L (ref 10–20)
AST SERPL W P-5'-P-CCNC: 16 U/L (ref 9–39)
BILIRUB SERPL-MCNC: 1.3 MG/DL (ref 0–1.2)
BUN SERPL-MCNC: 15 MG/DL (ref 6–23)
CALCIUM SERPL-MCNC: 9.6 MG/DL (ref 8.6–10.3)
CHLORIDE SERPL-SCNC: 105 MMOL/L (ref 98–107)
CHOLEST SERPL-MCNC: 133 MG/DL (ref 0–199)
CHOLESTEROL/HDL RATIO: 2.6
CO2 SERPL-SCNC: 29 MMOL/L (ref 21–32)
CREAT SERPL-MCNC: 0.75 MG/DL (ref 0.5–1.05)
EGFRCR SERPLBLD CKD-EPI 2021: 84 ML/MIN/1.73M*2
ERYTHROCYTE [DISTWIDTH] IN BLOOD BY AUTOMATED COUNT: 12.9 % (ref 11.5–14.5)
GLUCOSE SERPL-MCNC: 104 MG/DL (ref 74–99)
HCT VFR BLD AUTO: 45.9 % (ref 36–46)
HDLC SERPL-MCNC: 52 MG/DL
HGB BLD-MCNC: 15.2 G/DL (ref 12–16)
LDLC SERPL CALC-MCNC: 55 MG/DL
MCH RBC QN AUTO: 31.1 PG (ref 26–34)
MCHC RBC AUTO-ENTMCNC: 33.1 G/DL (ref 32–36)
MCV RBC AUTO: 94 FL (ref 80–100)
NON HDL CHOLESTEROL: 81 MG/DL (ref 0–149)
NRBC BLD-RTO: 0 /100 WBCS (ref 0–0)
PLATELET # BLD AUTO: 229 X10*3/UL (ref 150–450)
POTASSIUM SERPL-SCNC: 4 MMOL/L (ref 3.5–5.3)
PROT SERPL-MCNC: 6.5 G/DL (ref 6.4–8.2)
RBC # BLD AUTO: 4.88 X10*6/UL (ref 4–5.2)
SODIUM SERPL-SCNC: 143 MMOL/L (ref 136–145)
TRIGL SERPL-MCNC: 128 MG/DL (ref 0–149)
VLDL: 26 MG/DL (ref 0–40)
WBC # BLD AUTO: 7.5 X10*3/UL (ref 4.4–11.3)

## 2024-07-27 PROCEDURE — 85027 COMPLETE CBC AUTOMATED: CPT

## 2024-07-27 PROCEDURE — 36415 COLL VENOUS BLD VENIPUNCTURE: CPT

## 2024-07-27 PROCEDURE — 80061 LIPID PANEL: CPT

## 2024-07-27 PROCEDURE — 80053 COMPREHEN METABOLIC PANEL: CPT

## 2024-07-29 PROBLEM — M25.579 ANKLE PAIN: Status: RESOLVED | Noted: 2024-01-31 | Resolved: 2024-07-29

## 2024-07-29 PROBLEM — Z98.61 HISTORY OF PERCUTANEOUS CORONARY INTERVENTION: Status: ACTIVE | Noted: 2024-07-29

## 2024-07-29 PROBLEM — M19.90 OSTEOARTHRITIS: Status: RESOLVED | Noted: 2023-03-25 | Resolved: 2024-07-29

## 2024-08-08 ENCOUNTER — HOSPITAL ENCOUNTER (OUTPATIENT)
Dept: CARDIOLOGY | Facility: CLINIC | Age: 74
Discharge: HOME | End: 2024-08-08
Payer: MEDICARE

## 2024-08-08 DIAGNOSIS — I42.2 HYPERTROPHIC CARDIOMYOPATHY (MULTI): ICD-10-CM

## 2024-08-08 LAB
AORTIC VALVE MEAN GRADIENT: 3.1 MMHG
AORTIC VALVE PEAK VELOCITY: 1.27 M/S
AV PEAK GRADIENT: 6.4 MMHG
AVA (PEAK VEL): 2.02 CM2
AVA (VTI): 1.88 CM2
EJECTION FRACTION APICAL 4 CHAMBER: 63.1
EJECTION FRACTION: 63 %
LEFT ATRIUM VOLUME AREA LENGTH INDEX BSA: 30.3 ML/M2
LEFT VENTRICLE INTERNAL DIMENSION DIASTOLE: 4.52 CM (ref 3.5–6)
LEFT VENTRICULAR OUTFLOW TRACT DIAMETER: 1.96 CM
MITRAL VALVE E/A RATIO: 0.75
RIGHT VENTRICLE FREE WALL PEAK S': 0.12 CM/S
RIGHT VENTRICLE PEAK SYSTOLIC PRESSURE: 26 MMHG
TRICUSPID ANNULAR PLANE SYSTOLIC EXCURSION: 1.6 CM

## 2024-08-08 PROCEDURE — 93306 TTE W/DOPPLER COMPLETE: CPT

## 2024-08-08 PROCEDURE — 93306 TTE W/DOPPLER COMPLETE: CPT | Performed by: INTERNAL MEDICINE

## 2024-08-13 ENCOUNTER — APPOINTMENT (OUTPATIENT)
Dept: PRIMARY CARE | Facility: CLINIC | Age: 74
End: 2024-08-13
Payer: MEDICARE

## 2024-08-13 VITALS
WEIGHT: 146.6 LBS | SYSTOLIC BLOOD PRESSURE: 118 MMHG | HEIGHT: 62 IN | OXYGEN SATURATION: 98 % | BODY MASS INDEX: 26.98 KG/M2 | HEART RATE: 72 BPM | TEMPERATURE: 97.5 F | DIASTOLIC BLOOD PRESSURE: 70 MMHG

## 2024-08-13 DIAGNOSIS — I42.2 HYPERTROPHIC CARDIOMYOPATHY (MULTI): ICD-10-CM

## 2024-08-13 DIAGNOSIS — E04.1 THYROID NODULE: ICD-10-CM

## 2024-08-13 DIAGNOSIS — E78.5 HYPERLIPIDEMIA, UNSPECIFIED HYPERLIPIDEMIA TYPE: ICD-10-CM

## 2024-08-13 DIAGNOSIS — M47.899 OTHER OSTEOARTHRITIS OF SPINE, UNSPECIFIED SPINAL REGION: ICD-10-CM

## 2024-08-13 DIAGNOSIS — M81.0 AGE-RELATED OSTEOPOROSIS WITHOUT CURRENT PATHOLOGICAL FRACTURE: ICD-10-CM

## 2024-08-13 DIAGNOSIS — Z00.00 MEDICARE ANNUAL WELLNESS VISIT, SUBSEQUENT: Primary | ICD-10-CM

## 2024-08-13 DIAGNOSIS — I50.9 HEART FAILURE, UNSPECIFIED HF CHRONICITY, UNSPECIFIED HEART FAILURE TYPE (MULTI): ICD-10-CM

## 2024-08-13 DIAGNOSIS — I25.10 ATHSCL HEART DISEASE OF NATIVE CORONARY ARTERY W/O ANG PCTRS: ICD-10-CM

## 2024-08-13 DIAGNOSIS — Z12.11 ENCOUNTER FOR SCREENING FOR MALIGNANT NEOPLASM OF COLON: ICD-10-CM

## 2024-08-13 DIAGNOSIS — Z12.31 ENCOUNTER FOR SCREENING MAMMOGRAM FOR MALIGNANT NEOPLASM OF BREAST: ICD-10-CM

## 2024-08-13 LAB — TSH SERPL-ACNC: 3 MIU/L (ref 0.44–3.98)

## 2024-08-13 PROCEDURE — 1125F AMNT PAIN NOTED PAIN PRSNT: CPT | Performed by: FAMILY MEDICINE

## 2024-08-13 PROCEDURE — 1159F MED LIST DOCD IN RCRD: CPT | Performed by: FAMILY MEDICINE

## 2024-08-13 PROCEDURE — 3074F SYST BP LT 130 MM HG: CPT | Performed by: FAMILY MEDICINE

## 2024-08-13 PROCEDURE — G0439 PPPS, SUBSEQ VISIT: HCPCS | Performed by: FAMILY MEDICINE

## 2024-08-13 PROCEDURE — 1160F RVW MEDS BY RX/DR IN RCRD: CPT | Performed by: FAMILY MEDICINE

## 2024-08-13 PROCEDURE — 84443 ASSAY THYROID STIM HORMONE: CPT

## 2024-08-13 PROCEDURE — 99397 PER PM REEVAL EST PAT 65+ YR: CPT | Performed by: FAMILY MEDICINE

## 2024-08-13 PROCEDURE — 3008F BODY MASS INDEX DOCD: CPT | Performed by: FAMILY MEDICINE

## 2024-08-13 PROCEDURE — 1036F TOBACCO NON-USER: CPT | Performed by: FAMILY MEDICINE

## 2024-08-13 PROCEDURE — 1124F ACP DISCUSS-NO DSCNMKR DOCD: CPT | Performed by: FAMILY MEDICINE

## 2024-08-13 PROCEDURE — 99497 ADVNCD CARE PLAN 30 MIN: CPT | Performed by: FAMILY MEDICINE

## 2024-08-13 PROCEDURE — 3078F DIAST BP <80 MM HG: CPT | Performed by: FAMILY MEDICINE

## 2024-08-13 PROCEDURE — 1170F FXNL STATUS ASSESSED: CPT | Performed by: FAMILY MEDICINE

## 2024-08-13 RX ORDER — SIMVASTATIN 40 MG/1
TABLET, FILM COATED ORAL
COMMUNITY
End: 2024-08-13 | Stop reason: WASHOUT

## 2024-08-13 RX ORDER — AMOXICILLIN 500 MG/1
TABLET, FILM COATED ORAL
COMMUNITY
Start: 2024-04-01 | End: 2024-08-13 | Stop reason: WASHOUT

## 2024-08-13 RX ORDER — AMOXICILLIN 500 MG
CAPSULE ORAL
COMMUNITY

## 2024-08-13 ASSESSMENT — ACTIVITIES OF DAILY LIVING (ADL)
GROCERY SHOPPING: INDEPENDENT
USING TELEPHONE: INDEPENDENT
TOILETING: INDEPENDENT
NEEDS ASSISTANCE WITH FOOD: INDEPENDENT
TOILETING: INDEPENDENT
USING TRANSPORTATION: INDEPENDENT
JUDGMENT_ADEQUATE_SAFELY_COMPLETE_DAILY_ACTIVITIES: YES
ADEQUATE_TO_COMPLETE_ADL: YES
WALKS IN HOME: INDEPENDENT
GROOMING: INDEPENDENT
MANAGING FINANCES: INDEPENDENT
STIL DRIVING: YES
DOING HOUSEWORK: INDEPENDENT
BATHING: INDEPENDENT
ADEQUATE_TO_COMPLETE_ADL: YES
PREPARING MEALS: INDEPENDENT
JUDGMENT_ADEQUATE_SAFELY_COMPLETE_DAILY_ACTIVITIES: YES
PATIENT'S MEMORY ADEQUATE TO SAFELY COMPLETE DAILY ACTIVITIES?: YES
EATING: INDEPENDENT
DRESSING: INDEPENDENT
FEEDING YOURSELF: INDEPENDENT
BATHING: INDEPENDENT
DRESSING YOURSELF: INDEPENDENT
FEEDING: INDEPENDENT
TAKING MEDICATION: INDEPENDENT

## 2024-08-13 ASSESSMENT — GERIATRIC MINI NUTRITIONAL ASSESSMENT (MNA)
E NEUROPSYCHOLOGICAL PROBLEMS: NO PSYCHOLOGICAL PROBLEMS
D HAS SUFFERED PSYCHOLOGICAL STRESS OR ACUTE DISEASE IN THE PAST 3 MONTHS?: NO
C GENERAL MOBILITY: GOES OUT
A HAS FOOD INTAKE DECLINED OVER THE PAST 3 MONTHS DUE TO LOSS OF APPETITE, DIGESTIVE PROBLEMS, CHEWING OR SWALLOWING DIFFICULTIES?: NO DECREASE IN FOOD INTAKE
B WEIGHT LOSS DURING THE LAST 3 MONTHS: NO WEIGHT LOSS

## 2024-08-13 ASSESSMENT — ENCOUNTER SYMPTOMS
BACK PAIN: 1
NEUROLOGICAL NEGATIVE: 1
DEPRESSION: 0
CARDIOVASCULAR NEGATIVE: 1
RESPIRATORY NEGATIVE: 1
LOSS OF SENSATION IN FEET: 0
OCCASIONAL FEELINGS OF UNSTEADINESS: 0
PSYCHIATRIC NEGATIVE: 1
CONSTITUTIONAL NEGATIVE: 1
ENDOCRINE COMMENTS: THYROID NODULE
ENDOCRINE NEGATIVE: 1

## 2024-08-13 ASSESSMENT — ANXIETY QUESTIONNAIRES
1. FEELING NERVOUS, ANXIOUS, OR ON EDGE: NOT AT ALL
GAD7 TOTAL SCORE: 0
6. BECOMING EASILY ANNOYED OR IRRITABLE: NOT AT ALL
4. TROUBLE RELAXING: NOT AT ALL
3. WORRYING TOO MUCH ABOUT DIFFERENT THINGS: NOT AT ALL
7. FEELING AFRAID AS IF SOMETHING AWFUL MIGHT HAPPEN: NOT AT ALL
2. NOT BEING ABLE TO STOP OR CONTROL WORRYING: NOT AT ALL
5. BEING SO RESTLESS THAT IT IS HARD TO SIT STILL: NOT AT ALL

## 2024-08-13 ASSESSMENT — PAIN SCALES - GENERAL: PAINLEVEL: 5

## 2024-08-13 NOTE — PROGRESS NOTES
"Subjective   Patient ID: Marisela Fernández is a 74 y.o. female who presents for Annual Exam (Annual medicare wellness bw already done ).    HPI     Review of Systems   Constitutional: Negative.    HENT: Negative.     Respiratory: Negative.     Cardiovascular: Negative.         Dr Gatica   Endocrine: Negative.         Thyroid nodule   Genitourinary: Negative.    Musculoskeletal:  Positive for back pain.        Dr Sevilla   Neurological: Negative.    Psychiatric/Behavioral: Negative.         Objective   /70 (BP Location: Left arm)   Pulse 72   Temp 36.4 °C (97.5 °F) (Temporal)   Ht 1.575 m (5' 2\")   Wt 66.5 kg (146 lb 9.6 oz)   SpO2 98%   BMI 26.81 kg/m²     Physical Exam  Vitals and nursing note reviewed.   Constitutional:       Appearance: Normal appearance.   HENT:      Right Ear: Tympanic membrane normal.      Left Ear: Tympanic membrane normal.      Mouth/Throat:      Pharynx: Oropharynx is clear.   Cardiovascular:      Rate and Rhythm: Normal rate and regular rhythm.      Pulses: Normal pulses.      Heart sounds: Normal heart sounds.   Abdominal:      Palpations: Abdomen is soft.   Musculoskeletal:         General: Normal range of motion.   Neurological:      General: No focal deficit present.      Mental Status: She is alert and oriented to person, place, and time.   Psychiatric:         Mood and Affect: Mood normal.         Behavior: Behavior normal.         Assessment/Plan patient seen here for an annual Medicare wellness exam.  Reviewed her questionnaire she is agreeable to her responses.  We did discuss advanced directives.  She has no significant difficulty with depression or anxiety.  She has had recent lab work done we are able to review that we are scheduling her for a mammogram bone density colonoscopy and ultrasound of her thyroid.  Will see her back in a year  Problem List Items Addressed This Visit             ICD-10-CM    DJD (degenerative joint disease) M19.90    Athscl heart disease of " native coronary artery w/o ang pctrs I25.10    Hyperlipidemia E78.5    Osteoporosis M81.0    Relevant Orders    XR DEXA bone density    Heart failure, unspecified HF chronicity, unspecified heart failure type (Multi) I50.9    Hypertrophic cardiomyopathy (Multi) I42.2     Other Visit Diagnoses         Codes    Medicare annual wellness visit, subsequent    -  Primary Z00.00    BMI 26.0-26.9,adult     Z68.26    Thyroid nodule     E04.1    Relevant Orders    US thyroid    TSH with reflex to Free T4 if abnormal    Encounter for screening mammogram for malignant neoplasm of breast     Z12.31    Relevant Orders    BI mammo bilateral screening tomosynthesis    Encounter for screening for malignant neoplasm of colon     Z12.11    Relevant Orders    Colonoscopy Screening; Average Risk Patient

## 2024-08-13 NOTE — ACP (ADVANCE CARE PLANNING)
Confirming Previous Code Status:   Advance Care Planning Note     Discussion Date: 08/13/24   Discussion Participants: patient    The patient wishes to discuss Advance Care Planning today and the following is a brief summary of our discussion.     Patient has capacity to make their own medical decisions: Yes  Health Care Agent/Surrogate Decision Maker documented in chart: Yes    Documents on file and valid:  Advance Directive/Living Will: Yes   Health Care Power of : Yes  Other: none    Communication of Medical Status/Prognosis:   yes     Communication of Treatment Goals/Options:   yes     Treatment Decisions  Goals of Care: survival is paramount regardless of prognosis, treatment outcome, or burden   yes  Follow Up Plan  no  Team Members  myself  Time Statement: Total face to face time spent on advance care planning was 16 minutes with 16 minutes spent in counseling, including the explanation.    George Avina DO  8/13/2024 11:41 AM

## 2024-08-14 ENCOUNTER — TELEPHONE (OUTPATIENT)
Dept: SURGERY | Facility: CLINIC | Age: 74
End: 2024-08-14
Payer: MEDICARE

## 2024-08-14 DIAGNOSIS — Z12.11 SCREENING FOR COLON CANCER: Primary | ICD-10-CM

## 2024-08-14 RX ORDER — POLYETHYLENE GLYCOL 3350, SODIUM SULFATE ANHYDROUS, SODIUM BICARBONATE, SODIUM CHLORIDE, POTASSIUM CHLORIDE 236; 22.74; 6.74; 5.86; 2.97 G/4L; G/4L; G/4L; G/4L; G/4L
4 POWDER, FOR SOLUTION ORAL ONCE
Qty: 4000 ML | Refills: 0 | Status: SHIPPED | OUTPATIENT
Start: 2024-08-14 | End: 2024-08-14

## 2024-08-14 NOTE — TELEPHONE ENCOUNTER
Pended colonoscopy prep, Golytely for procedure as insurance wouldn't cover small volume.  Instructions sent to patient via [employer].

## 2024-08-14 NOTE — TELEPHONE ENCOUNTER
Received a message from patient that she was hoping to be able to use Miralax prep for her colonoscopy scheduled in December.  I spoke with patient that unfortunately her insurance doesn't cover any smaller volume preps, and per Dr. Herbert Miralax is not a prep he wants used.  Patient verbalized understanding.

## 2024-08-15 ENCOUNTER — HOSPITAL ENCOUNTER (OUTPATIENT)
Dept: RADIOLOGY | Facility: HOSPITAL | Age: 74
Discharge: HOME | End: 2024-08-15
Payer: MEDICARE

## 2024-08-15 ENCOUNTER — APPOINTMENT (OUTPATIENT)
Dept: CARDIOLOGY | Facility: CLINIC | Age: 74
End: 2024-08-15
Payer: MEDICARE

## 2024-08-15 VITALS
BODY MASS INDEX: 26.87 KG/M2 | HEIGHT: 62 IN | SYSTOLIC BLOOD PRESSURE: 142 MMHG | DIASTOLIC BLOOD PRESSURE: 82 MMHG | HEART RATE: 55 BPM | WEIGHT: 146 LBS | OXYGEN SATURATION: 97 %

## 2024-08-15 DIAGNOSIS — I42.2 HYPERTROPHIC CARDIOMYOPATHY (MULTI): Primary | ICD-10-CM

## 2024-08-15 DIAGNOSIS — E04.1 THYROID NODULE: ICD-10-CM

## 2024-08-15 DIAGNOSIS — I25.10 ATHSCL HEART DISEASE OF NATIVE CORONARY ARTERY W/O ANG PCTRS: ICD-10-CM

## 2024-08-15 DIAGNOSIS — Z98.61 HISTORY OF PERCUTANEOUS CORONARY INTERVENTION: ICD-10-CM

## 2024-08-15 DIAGNOSIS — I10 HTN (HYPERTENSION), BENIGN: ICD-10-CM

## 2024-08-15 DIAGNOSIS — I25.2 H/O MYOCARDIAL INFARCTION, GREATER THAN 8 WEEKS: ICD-10-CM

## 2024-08-15 PROCEDURE — 3074F SYST BP LT 130 MM HG: CPT | Performed by: INTERNAL MEDICINE

## 2024-08-15 PROCEDURE — 76536 US EXAM OF HEAD AND NECK: CPT

## 2024-08-15 PROCEDURE — 99214 OFFICE O/P EST MOD 30 MIN: CPT | Performed by: INTERNAL MEDICINE

## 2024-08-15 PROCEDURE — 1160F RVW MEDS BY RX/DR IN RCRD: CPT | Performed by: INTERNAL MEDICINE

## 2024-08-15 PROCEDURE — 93005 ELECTROCARDIOGRAM TRACING: CPT | Performed by: INTERNAL MEDICINE

## 2024-08-15 PROCEDURE — 93010 ELECTROCARDIOGRAM REPORT: CPT | Performed by: INTERNAL MEDICINE

## 2024-08-15 PROCEDURE — 3008F BODY MASS INDEX DOCD: CPT | Performed by: INTERNAL MEDICINE

## 2024-08-15 PROCEDURE — 1159F MED LIST DOCD IN RCRD: CPT | Performed by: INTERNAL MEDICINE

## 2024-08-15 PROCEDURE — 3078F DIAST BP <80 MM HG: CPT | Performed by: INTERNAL MEDICINE

## 2024-08-15 PROCEDURE — 76536 US EXAM OF HEAD AND NECK: CPT | Performed by: RADIOLOGY

## 2024-08-15 NOTE — PROGRESS NOTES
"  Subjective  Marisela Fernández  is a 74 y.o. year old female who presents for F/U hypertrophic cardiomyopathy    Blood pressure 142/82, pulse 55, height 1.575 m (5' 2\"), weight 66.2 kg (146 lb), SpO2 97%.   Codeine  History reviewed. No pertinent past medical history.  Past Surgical History:   Procedure Laterality Date    COLONOSCOPY  10/22/2015    Complete Colonoscopy    HYSTERECTOMY  04/07/2015    Hysterectomy    OTHER SURGICAL HISTORY  04/07/2015    Corneal LASIK Bilateral    OTHER SURGICAL HISTORY  04/07/2015    Transcath Placement Of Intrathoracic Carotid Artery Stent    OTHER SURGICAL HISTORY  05/19/2020    Previous Stent Placement    TONSILLECTOMY  02/13/2018    Tonsillectomy With Adenoidectomy     No family history on file.  @SOC    Current Outpatient Medications   Medication Sig Dispense Refill    alendronate (Fosamax) 70 mg tablet Take 1 tablet (70 mg) by mouth 1 (one) time per week. 12 tablet 3    aspirin 81 mg EC tablet Take 1 tablet (81 mg) by mouth once daily.      glucosamine sulfate 500 mg capsule Take 1 capsule by mouth 2 times a day.      lisinopril 2.5 mg tablet Take 1 tablet (2.5 mg) by mouth once daily. 90 tablet 3    metoprolol succinate XL (Toprol-XL) 25 mg 24 hr tablet TAKE ONE-HALF TABLET BY MOUTH TWICE DAILY 90 tablet 3    omega-3 fatty acids-fish oil 300-1,000 mg capsule Take by mouth.      simvastatin (Zocor) 80 mg tablet TAKE 1/2 TABLET BY MOUTH DAILY 45 tablet 3    vit A/vit C/vit E/zinc/copper (PRESERVISION AREDS ORAL) Take by mouth.      vit A/vit C/vit E/zinc/copper (PRESERVISION AREDS ORAL) Take by mouth.       No current facility-administered medications for this visit.        ROS  Review of Systems   All other systems reviewed and are negative.      Physical Exam  Physical Exam  Constitutional:       Appearance: Normal appearance.   Cardiovascular:      Rate and Rhythm: Normal rate and regular rhythm.      Heart sounds: S1 normal and S2 normal.   Abdominal:      Palpations: " Abdomen is soft.   Skin:     General: Skin is warm and dry.   Neurological:      General: No focal deficit present.      Mental Status: She is alert and oriented to person, place, and time.   Psychiatric:         Mood and Affect: Mood normal.         Behavior: Behavior normal.          EKG  No results found for this or any previous visit (from the past 4464 hour(s)).    Problem List Items Addressed This Visit       Athscl heart disease of native coronary artery w/o ang pctrs    Relevant Orders    Transthoracic Echo (TTE) Complete    Follow Up In Cardiology    HTN (hypertension), benign    H/O myocardial infarction, greater than 8 weeks    Relevant Orders    Transthoracic Echo (TTE) Complete    Follow Up In Cardiology    Hypertrophic cardiomyopathy (Multi) - Primary     8/8/24 echocardiogram mild concentric LVH with LVEF = 60-65%         Relevant Orders    ECG 12 Lead    Transthoracic Echo (TTE) Complete    Follow Up In Cardiology    History of percutaneous coronary intervention         Retur  6 months with echocardiogram a few dys prior to return      Julius Gatica MD

## 2024-09-12 ENCOUNTER — HOSPITAL ENCOUNTER (OUTPATIENT)
Dept: RADIOLOGY | Facility: CLINIC | Age: 74
Discharge: HOME | End: 2024-09-12
Payer: MEDICARE

## 2024-09-12 VITALS — WEIGHT: 146 LBS | HEIGHT: 62 IN | BODY MASS INDEX: 26.87 KG/M2

## 2024-09-12 DIAGNOSIS — Z12.31 ENCOUNTER FOR SCREENING MAMMOGRAM FOR MALIGNANT NEOPLASM OF BREAST: ICD-10-CM

## 2024-09-12 DIAGNOSIS — M81.0 AGE-RELATED OSTEOPOROSIS WITHOUT CURRENT PATHOLOGICAL FRACTURE: ICD-10-CM

## 2024-09-12 PROCEDURE — 77067 SCR MAMMO BI INCL CAD: CPT

## 2024-09-12 PROCEDURE — 77067 SCR MAMMO BI INCL CAD: CPT | Performed by: RADIOLOGY

## 2024-09-12 PROCEDURE — 77080 DXA BONE DENSITY AXIAL: CPT

## 2024-09-12 PROCEDURE — 77063 BREAST TOMOSYNTHESIS BI: CPT | Performed by: RADIOLOGY

## 2024-09-21 DIAGNOSIS — M81.0 AGE-RELATED OSTEOPOROSIS WITHOUT CURRENT PATHOLOGICAL FRACTURE: ICD-10-CM

## 2024-09-23 ENCOUNTER — TELEPHONE (OUTPATIENT)
Dept: PRIMARY CARE | Facility: CLINIC | Age: 74
End: 2024-09-23
Payer: MEDICARE

## 2024-09-23 DIAGNOSIS — M81.0 AGE-RELATED OSTEOPOROSIS WITHOUT CURRENT PATHOLOGICAL FRACTURE: ICD-10-CM

## 2024-09-23 RX ORDER — ALENDRONATE SODIUM 70 MG/1
70 TABLET ORAL
Qty: 12 TABLET | Refills: 0 | Status: SHIPPED | OUTPATIENT
Start: 2024-09-29 | End: 2024-09-23 | Stop reason: SDUPTHER

## 2024-09-23 RX ORDER — ALENDRONATE SODIUM 70 MG/1
70 TABLET ORAL
Qty: 12 TABLET | Refills: 0 | Status: SHIPPED | OUTPATIENT
Start: 2024-09-29

## 2024-09-28 DIAGNOSIS — I10 HTN (HYPERTENSION), BENIGN: ICD-10-CM

## 2024-09-30 RX ORDER — LISINOPRIL 2.5 MG/1
2.5 TABLET ORAL DAILY
Qty: 90 TABLET | Refills: 0 | Status: SHIPPED | OUTPATIENT
Start: 2024-09-30

## 2024-12-11 ENCOUNTER — TELEPHONE (OUTPATIENT)
Dept: SURGERY | Facility: CLINIC | Age: 74
End: 2024-12-11
Payer: MEDICARE

## 2024-12-11 NOTE — TELEPHONE ENCOUNTER
Spoke with patient that I saw she tested positive for Covid on 12/ 9 and is scheduled for a colonoscopy at the end of the month.  I discussed with PAT who said as long as she has no fever or respiratory issues she is free to proceed as scheduled.  I relayed this information with patient, gave her my direct phone number to call me on December 23 to let me know if she is symptomatic.  Patient agreed and all of her questions were addressed and answered.

## 2024-12-12 ENCOUNTER — HOSPITAL ENCOUNTER (EMERGENCY)
Facility: HOSPITAL | Age: 74
Discharge: HOME | End: 2024-12-12
Payer: MEDICARE

## 2024-12-12 ENCOUNTER — APPOINTMENT (OUTPATIENT)
Dept: RADIOLOGY | Facility: HOSPITAL | Age: 74
End: 2024-12-12
Payer: MEDICARE

## 2024-12-12 VITALS
SYSTOLIC BLOOD PRESSURE: 187 MMHG | WEIGHT: 142 LBS | HEART RATE: 65 BPM | BODY MASS INDEX: 26.13 KG/M2 | DIASTOLIC BLOOD PRESSURE: 97 MMHG | RESPIRATION RATE: 16 BRPM | OXYGEN SATURATION: 96 % | HEIGHT: 62 IN | TEMPERATURE: 97.3 F

## 2024-12-12 DIAGNOSIS — S60.131A SUBUNGUAL HEMATOMA OF RIGHT MIDDLE FINGER: Primary | ICD-10-CM

## 2024-12-12 PROCEDURE — 90471 IMMUNIZATION ADMIN: CPT | Performed by: PHYSICIAN ASSISTANT

## 2024-12-12 PROCEDURE — 73130 X-RAY EXAM OF HAND: CPT | Mod: RIGHT SIDE | Performed by: RADIOLOGY

## 2024-12-12 PROCEDURE — 90715 TDAP VACCINE 7 YRS/> IM: CPT | Performed by: PHYSICIAN ASSISTANT

## 2024-12-12 PROCEDURE — 73130 X-RAY EXAM OF HAND: CPT | Mod: RT

## 2024-12-12 PROCEDURE — 11740 EVACUATION SUBUNGUAL HMTMA: CPT | Mod: F7

## 2024-12-12 PROCEDURE — 99284 EMERGENCY DEPT VISIT MOD MDM: CPT

## 2024-12-12 PROCEDURE — 99283 EMERGENCY DEPT VISIT LOW MDM: CPT | Mod: 25

## 2024-12-12 PROCEDURE — 2500000004 HC RX 250 GENERAL PHARMACY W/ HCPCS (ALT 636 FOR OP/ED): Performed by: PHYSICIAN ASSISTANT

## 2024-12-12 RX ADMIN — TETANUS TOXOID, REDUCED DIPHTHERIA TOXOID AND ACELLULAR PERTUSSIS VACCINE, ADSORBED 0.5 ML: 5; 2.5; 8; 8; 2.5 SUSPENSION INTRAMUSCULAR at 12:03

## 2024-12-12 ASSESSMENT — COLUMBIA-SUICIDE SEVERITY RATING SCALE - C-SSRS
1. IN THE PAST MONTH, HAVE YOU WISHED YOU WERE DEAD OR WISHED YOU COULD GO TO SLEEP AND NOT WAKE UP?: NO
6. HAVE YOU EVER DONE ANYTHING, STARTED TO DO ANYTHING, OR PREPARED TO DO ANYTHING TO END YOUR LIFE?: NO
2. HAVE YOU ACTUALLY HAD ANY THOUGHTS OF KILLING YOURSELF?: NO

## 2024-12-12 ASSESSMENT — PAIN SCALES - GENERAL: PAINLEVEL_OUTOF10: 6

## 2024-12-12 ASSESSMENT — PAIN - FUNCTIONAL ASSESSMENT: PAIN_FUNCTIONAL_ASSESSMENT: 0-10

## 2024-12-12 ASSESSMENT — PAIN DESCRIPTION - ORIENTATION: ORIENTATION: RIGHT

## 2024-12-12 ASSESSMENT — PAIN DESCRIPTION - LOCATION: LOCATION: FINGER (COMMENT WHICH ONE)

## 2024-12-12 ASSESSMENT — PAIN DESCRIPTION - PAIN TYPE: TYPE: ACUTE PAIN

## 2024-12-12 NOTE — DISCHARGE INSTRUCTIONS
Your nail may or may not die and fall off.  Keep your wound clean and dry.  Please follow with your PCP for reevaluation.  You can also follow-up with wound care clinic for wound check.  You can use ice to the wound.  Tylenol or ibuprofen for pain.  Return to ER if symptoms change or worsen.

## 2024-12-12 NOTE — ED TRIAGE NOTES
Pt was going to open the door to her grandsons room when he closed the door and got her middle finger on her right hand caught in the door. Pt has a small laceration on her finger and has a lot of swelling to the finger. Finger nail is black in color. Pt able to move finger with pain.

## 2024-12-12 NOTE — ED PROVIDER NOTES
"Limitations to History: none  External Records Reviewed  Independent Historians: self  Social determinants affecting care: none    HPI  Marisela Fernández is a 74 y.o. female who presents emergency department due to right third digit injury this morning.  She reports that she was trying to wake up her grandson.  She reports that she went to go into his room and he \"body slammed the door \".  She reports that her right third digit went into the door.  She reports pain to the right third digit.  Denies any loss of function of the finger.  Denies numbness or tingling.  She is not up-to-date on her tetanus immunization.  Denies any other injuries.  She has no further complaints.    PMH  No past medical history on file. reviewed by myself.    Meds  Current Outpatient Medications   Medication Instructions    alendronate (FOSAMAX) 70 mg, oral, Once Weekly    aspirin 81 mg EC tablet 1 tablet, oral, Daily    glucosamine sulfate 500 mg capsule 1 capsule, oral, 2 times daily    lisinopril 2.5 mg, oral, Daily    metoprolol succinate XL (Toprol-XL) 25 mg 24 hr tablet oral, 2 times daily    omega-3 fatty acids-fish oil 300-1,000 mg capsule oral    simvastatin (ZOCOR) 40 mg, oral, Daily    vit A/vit C/vit E/zinc/copper (PRESERVISION AREDS ORAL) oral    vit A/vit C/vit E/zinc/copper (PRESERVISION AREDS ORAL) oral       Allergies  Allergies   Allergen Reactions    Codeine Nausea/vomiting    reviewed by myself.    SHx  Social History     Tobacco Use    Smoking status: Never    Smokeless tobacco: Never   Substance Use Topics    Alcohol use: Never    Drug use: Never    reviewed by myself.      ------------------------------------------------------------------------------------------------------------------------------------------    BP (!) 187/97 (BP Location: Left arm, Patient Position: Sitting)   Pulse 65   Temp 36.3 °C (97.3 °F)   Resp 16   Ht 1.575 m (5' 2\")   Wt 64.4 kg (142 lb)   SpO2 96%   BMI 25.97 kg/m²     Physical " Exam  Constitutional:       General: She is not in acute distress.     Appearance: Normal appearance. She is normal weight. She is not ill-appearing or toxic-appearing.   HENT:      Head: Normocephalic.      Nose: Nose normal.      Mouth/Throat:      Mouth: Mucous membranes are moist.   Eyes:      Extraocular Movements: Extraocular movements intact.      Conjunctiva/sclera: Conjunctivae normal.   Cardiovascular:      Rate and Rhythm: Normal rate and regular rhythm.   Pulmonary:      Effort: Pulmonary effort is normal.      Breath sounds: Normal breath sounds.   Musculoskeletal:      Cervical back: Neck supple.      Comments: Tenderness palpation of the right third digit distally.  Full active range of motion of the digits of the right hand with extension and flexion.  Sensation is intact distally.  Capillary refill brisk.  Radial pulse +2/4 on the right.   Skin:     General: Skin is warm and dry.      Comments: Subungual hematoma to the right third digit right hand.  Small puncture wound to the palmar surface of the right third digit distally.  Small amount of bleeding noted.   Neurological:      Mental Status: She is alert and oriented to person, place, and time.   Psychiatric:         Attention and Perception: Attention normal.         Mood and Affect: Mood normal.          ------------------------------------------------------------------------------------------------------------------------------------------  Labs  Labs Reviewed - No data to display     Imaging  XR hand right 3+ views   Final Result   Slight deformity at the first metacarpophalangeal joint. Suspect acute   fracture of the trapezium. Recommend clinical correlation.   Signed by Javier Smith MD           ED Course  Diagnoses as of 12/12/24 1307   Subungual hematoma of right middle finger        Medical Decision Making: She did not appear ill or toxic.  Vital signs reviewed.  She is hypertensive.  Otherwise hemodynamically stable.    Differential  diagnoses considered: Fracture, finger contusion, subungual hematoma, others    X-ray the hand showing no acute fracture to the right third digit.  There is a slight deformity of the first metacarpophalangeal joint that could be an acute fracture.  I reassessed the patient.  She does not have any acute tenderness here.  This is not the injured area.  She reports known history of arthritis there.  Do not believe that this is an acute fracture.  I discussed with her about the x-ray imaging.  Her hand was cleaned with normal saline and Hibiclens.  No indication for sutures for the puncture wounds.  The nail was trephinated with bovie.  Blood was expressed.  She had immediate relief.  She tolerated well.  No complications.  No indication for antibiotics at this time.  I recommend she follow-up with her PCP for wound check in the next 2 to 3 days.  She can use ice and take Tylenol or ibuprofen for pain control.  She is to return to the ER immediately if symptoms change or worsen.  She verbalized understanding and agreed to plan of care.  She was discharged home in stable condition.    Diagnosis: Subungual hematoma   Plan: discharge     Gregory Pena PA-C  12/12/24 1989

## 2024-12-20 DIAGNOSIS — I10 HTN (HYPERTENSION), BENIGN: ICD-10-CM

## 2024-12-23 RX ORDER — LISINOPRIL 2.5 MG/1
2.5 TABLET ORAL DAILY
Qty: 90 TABLET | Refills: 0 | Status: SHIPPED | OUTPATIENT
Start: 2024-12-23

## 2024-12-27 ENCOUNTER — HOSPITAL ENCOUNTER (OUTPATIENT)
Dept: GASTROENTEROLOGY | Facility: HOSPITAL | Age: 74
Discharge: HOME | End: 2024-12-27
Payer: MEDICARE

## 2024-12-27 ENCOUNTER — ANESTHESIA EVENT (OUTPATIENT)
Dept: GASTROENTEROLOGY | Facility: HOSPITAL | Age: 74
End: 2024-12-27
Payer: MEDICARE

## 2024-12-27 ENCOUNTER — ANESTHESIA (OUTPATIENT)
Dept: GASTROENTEROLOGY | Facility: HOSPITAL | Age: 74
End: 2024-12-27
Payer: MEDICARE

## 2024-12-27 VITALS
RESPIRATION RATE: 18 BRPM | SYSTOLIC BLOOD PRESSURE: 130 MMHG | HEIGHT: 62 IN | DIASTOLIC BLOOD PRESSURE: 87 MMHG | WEIGHT: 142 LBS | TEMPERATURE: 97.2 F | HEART RATE: 80 BPM | OXYGEN SATURATION: 97 % | BODY MASS INDEX: 26.13 KG/M2

## 2024-12-27 DIAGNOSIS — Z12.11 ENCOUNTER FOR SCREENING FOR MALIGNANT NEOPLASM OF COLON: Primary | ICD-10-CM

## 2024-12-27 PROBLEM — R11.2 PONV (POSTOPERATIVE NAUSEA AND VOMITING): Status: ACTIVE | Noted: 2024-12-27

## 2024-12-27 PROBLEM — Z98.890 PONV (POSTOPERATIVE NAUSEA AND VOMITING): Status: ACTIVE | Noted: 2024-12-27

## 2024-12-27 PROCEDURE — 45380 COLONOSCOPY AND BIOPSY: CPT | Performed by: SURGERY

## 2024-12-27 PROCEDURE — 2500000004 HC RX 250 GENERAL PHARMACY W/ HCPCS (ALT 636 FOR OP/ED)

## 2024-12-27 PROCEDURE — 7100000009 HC PHASE TWO TIME - INITIAL BASE CHARGE: Performed by: SURGERY

## 2024-12-27 PROCEDURE — 7100000010 HC PHASE TWO TIME - EACH INCREMENTAL 1 MINUTE: Performed by: SURGERY

## 2024-12-27 PROCEDURE — 3700000002 HC GENERAL ANESTHESIA TIME - EACH INCREMENTAL 1 MINUTE: Performed by: SURGERY

## 2024-12-27 PROCEDURE — 3700000001 HC GENERAL ANESTHESIA TIME - INITIAL BASE CHARGE: Performed by: SURGERY

## 2024-12-27 RX ORDER — PROPOFOL 10 MG/ML
INJECTION, EMULSION INTRAVENOUS AS NEEDED
Status: DISCONTINUED | OUTPATIENT
Start: 2024-12-27 | End: 2024-12-27

## 2024-12-27 RX ORDER — LIDOCAINE HCL/PF 100 MG/5ML
SYRINGE (ML) INTRAVENOUS AS NEEDED
Status: DISCONTINUED | OUTPATIENT
Start: 2024-12-27 | End: 2024-12-27

## 2024-12-27 SDOH — HEALTH STABILITY: MENTAL HEALTH: CURRENT SMOKER: 0

## 2024-12-27 ASSESSMENT — ENCOUNTER SYMPTOMS
CARDIOVASCULAR NEGATIVE: 1
EYES NEGATIVE: 1
HEMATOLOGIC/LYMPHATIC NEGATIVE: 1
NEUROLOGICAL NEGATIVE: 1
PSYCHIATRIC NEGATIVE: 1
MUSCULOSKELETAL NEGATIVE: 1
CONSTITUTIONAL NEGATIVE: 1
ALLERGIC/IMMUNOLOGIC NEGATIVE: 1
RESPIRATORY NEGATIVE: 1
ENDOCRINE NEGATIVE: 1
GASTROINTESTINAL NEGATIVE: 1

## 2024-12-27 ASSESSMENT — PAIN SCALES - GENERAL
PAINLEVEL_OUTOF10: 0 - NO PAIN

## 2024-12-27 ASSESSMENT — PAIN - FUNCTIONAL ASSESSMENT
PAIN_FUNCTIONAL_ASSESSMENT: 0-10

## 2024-12-27 NOTE — ANESTHESIA POSTPROCEDURE EVALUATION
Patient: Marisela Fernández    Procedure Summary       Date: 12/27/24 Room / Location: [facility]    Anesthesia Start: 0819 Anesthesia Stop: 0914    Procedure: COLONOSCOPY Diagnosis:       Encounter for screening for malignant neoplasm of colon      Encounter for screening for malignant neoplasm of colon    Scheduled Providers: Min Herbert MD PhD; CLAUDIA Negrete; Julian Acevedo MD Responsible Provider: Julian Acevedo MD    Anesthesia Type: MAC ASA Status: 3            Anesthesia Type: MAC    Vitals Value Taken Time   BP 99/63 12/27/24 0915   Temp 36.2 °C (97.2 °F) 12/27/24 0912   Pulse 73 12/27/24 0924   Resp 16 12/27/24 0915   SpO2 97 % 12/27/24 0924   Vitals shown include unfiled device data.    Anesthesia Post Evaluation    Patient location during evaluation: PACU  Patient participation: complete - patient participated  Level of consciousness: awake and alert  Pain management: adequate  Airway patency: patent  Cardiovascular status: acceptable  Respiratory status: acceptable  Hydration status: acceptable  Postoperative Nausea and Vomiting: none        There were no known notable events for this encounter.

## 2024-12-27 NOTE — ANESTHESIA PREPROCEDURE EVALUATION
Patient: Marisela Fernández    Procedure Information       Date/Time: 12/27/24 0810    Scheduled providers: Min Herbert MD PhD; CLADUIA Negrete; Julian Acevedo MD    Procedure: COLONOSCOPY    Location: [facility]            Relevant Problems   Anesthesia   (+) PONV (postoperative nausea and vomiting)      Cardiac   (+) Athscl heart disease of native coronary artery w/o ang pctrs   (+) H/O myocardial infarction, greater than 8 weeks   (+) HTN (hypertension), benign   (+) Hyperlipidemia      Pulmonary   (+) Short of breath on exertion      Musculoskeletal   (+) DJD (degenerative joint disease)       Clinical information reviewed:   Tobacco  Allergies  Meds   Med Hx  Surg Hx   Fam Hx  Soc Hx        NPO Detail:  NPO/Void Status  Date of Last Liquid: 12/27/24  Time of Last Liquid: 0645  Date of Last Solid: 12/25/24  Time of Last Solid: 2000         Physical Exam    Airway  Mallampati: II  TM distance: >3 FB  Neck ROM: full     Cardiovascular - normal exam  Rhythm: regular  Rate: normal     Dental - normal exam     Pulmonary - normal exam     Abdominal            Anesthesia Plan    History of general anesthesia?: yes  History of complications of general anesthesia?: no    ASA 3     MAC     The patient is not a current smoker.    intravenous induction   Anesthetic plan and risks discussed with patient.    Plan discussed with CLAUDIA.

## 2024-12-27 NOTE — H&P
History Of Present Illness  Marisela Fernández is a 74 y.o. female presenting with for screening colonoscopy. Last colonoscopy 3 years ago with benign polyps. No FH of any cancers. Doing well overall     Past Medical History  History reviewed. No pertinent past medical history.    Surgical History  Past Surgical History:   Procedure Laterality Date    COLONOSCOPY  10/22/2015    Complete Colonoscopy    HYSTERECTOMY  04/07/2015    Hysterectomy    OTHER SURGICAL HISTORY  04/07/2015    Corneal LASIK Bilateral    OTHER SURGICAL HISTORY  04/07/2015    Transcath Placement Of Intrathoracic Carotid Artery Stent    OTHER SURGICAL HISTORY  05/19/2020    Previous Stent Placement    TONSILLECTOMY  02/13/2018    Tonsillectomy With Adenoidectomy        Social History  She reports that she has never smoked. She has never used smokeless tobacco. She reports that she does not drink alcohol and does not use drugs.    Family History  No family history on file.     Allergies  Codeine    Review of Systems   Constitutional: Negative.    HENT: Negative.     Eyes: Negative.    Respiratory: Negative.     Cardiovascular: Negative.    Gastrointestinal: Negative.    Endocrine: Negative.    Genitourinary: Negative.    Musculoskeletal: Negative.    Skin: Negative.    Allergic/Immunologic: Negative.    Neurological: Negative.    Hematological: Negative.    Psychiatric/Behavioral: Negative.          Constitutional: no acute distress  Neuro: A/O x4, no gross deficits   Psych: normal affect  HEENT: No deformities, no scleral icterus   Cardiac: RRR  Pulmonary: unlabored respirations   Abdomen: soft, non distended, non tender  Skin: warm and dry overall    Extremities: no swelling noted  MSK: moving all four            Assessment/Plan   Assessment & Plan  Encounter for screening for malignant neoplasm of colon      Marisela Fernández is a 74 y.o. female presenting with for screening colonoscopy.       - plan to proceed with screening colonoscopy  possible biopsy  - consent in chart      Lawanda Cleaning MD

## 2025-01-02 LAB
LABORATORY COMMENT REPORT: NORMAL
PATH REPORT.FINAL DX SPEC: NORMAL
PATH REPORT.GROSS SPEC: NORMAL
PATH REPORT.RELEVANT HX SPEC: NORMAL
PATH REPORT.TOTAL CANCER: NORMAL

## 2025-01-27 DIAGNOSIS — E78.49 OTHER HYPERLIPIDEMIA: ICD-10-CM

## 2025-01-27 DIAGNOSIS — I42.2 HYPERTROPHIC CARDIOMYOPATHY (MULTI): ICD-10-CM

## 2025-01-29 PROBLEM — M54.50 LUMBAR SPINE PAIN: Status: ACTIVE | Noted: 2024-08-22

## 2025-01-29 PROBLEM — Z98.61 HISTORY OF PERCUTANEOUS CORONARY INTERVENTION: Status: RESOLVED | Noted: 2024-07-29 | Resolved: 2025-01-29

## 2025-02-01 LAB
ALBUMIN SERPL-MCNC: 4.4 G/DL (ref 3.6–5.1)
ALP SERPL-CCNC: 62 U/L (ref 37–153)
ALT SERPL-CCNC: 10 U/L (ref 6–29)
ANION GAP SERPL CALCULATED.4IONS-SCNC: 9 MMOL/L (CALC) (ref 7–17)
AST SERPL-CCNC: 16 U/L (ref 10–35)
BILIRUB SERPL-MCNC: 1.3 MG/DL (ref 0.2–1.2)
BUN SERPL-MCNC: 14 MG/DL (ref 7–25)
CALCIUM SERPL-MCNC: 9.5 MG/DL (ref 8.6–10.4)
CHLORIDE SERPL-SCNC: 104 MMOL/L (ref 98–110)
CHOLEST SERPL-MCNC: 170 MG/DL
CHOLEST/HDLC SERPL: 3 (CALC)
CO2 SERPL-SCNC: 28 MMOL/L (ref 20–32)
CREAT SERPL-MCNC: 0.69 MG/DL (ref 0.6–1)
EGFRCR SERPLBLD CKD-EPI 2021: 91 ML/MIN/1.73M2
ERYTHROCYTE [DISTWIDTH] IN BLOOD BY AUTOMATED COUNT: 12.2 % (ref 11–15)
GLUCOSE SERPL-MCNC: 95 MG/DL (ref 65–99)
HCT VFR BLD AUTO: 43.9 % (ref 35–45)
HDLC SERPL-MCNC: 56 MG/DL
HGB BLD-MCNC: 14.3 G/DL (ref 11.7–15.5)
LDLC SERPL CALC-MCNC: 80 MG/DL (CALC)
MCH RBC QN AUTO: 30 PG (ref 27–33)
MCHC RBC AUTO-ENTMCNC: 32.6 G/DL (ref 32–36)
MCV RBC AUTO: 92 FL (ref 80–100)
NONHDLC SERPL-MCNC: 114 MG/DL (CALC)
PLATELET # BLD AUTO: 216 THOUSAND/UL (ref 140–400)
PMV BLD REES-ECKER: 9.8 FL (ref 7.5–12.5)
POTASSIUM SERPL-SCNC: 3.8 MMOL/L (ref 3.5–5.3)
PROT SERPL-MCNC: 6.8 G/DL (ref 6.1–8.1)
RBC # BLD AUTO: 4.77 MILLION/UL (ref 3.8–5.1)
SODIUM SERPL-SCNC: 141 MMOL/L (ref 135–146)
TRIGL SERPL-MCNC: 257 MG/DL
WBC # BLD AUTO: 8.7 THOUSAND/UL (ref 3.8–10.8)

## 2025-02-18 ENCOUNTER — HOSPITAL ENCOUNTER (OUTPATIENT)
Dept: CARDIOLOGY | Facility: CLINIC | Age: 75
Discharge: HOME | End: 2025-02-18
Payer: MEDICARE

## 2025-02-18 VITALS
WEIGHT: 142 LBS | BODY MASS INDEX: 26.13 KG/M2 | SYSTOLIC BLOOD PRESSURE: 130 MMHG | DIASTOLIC BLOOD PRESSURE: 87 MMHG | HEIGHT: 62 IN

## 2025-02-18 DIAGNOSIS — I42.1 OBSTRUCTIVE HYPERTROPHIC CARDIOMYOPATHY (MULTI): ICD-10-CM

## 2025-02-18 DIAGNOSIS — I25.10 ATHSCL HEART DISEASE OF NATIVE CORONARY ARTERY W/O ANG PCTRS: ICD-10-CM

## 2025-02-18 DIAGNOSIS — I42.2 HYPERTROPHIC CARDIOMYOPATHY (MULTI): ICD-10-CM

## 2025-02-18 DIAGNOSIS — I25.2 H/O MYOCARDIAL INFARCTION, GREATER THAN 8 WEEKS: ICD-10-CM

## 2025-02-18 LAB
AORTIC VALVE MEAN GRADIENT: 3 MMHG
AORTIC VALVE PEAK VELOCITY: 1.22 M/S
AV PEAK GRADIENT: 6 MMHG
AVA (PEAK VEL): 3.19 CM2
AVA (VTI): 3.36 CM2
EJECTION FRACTION APICAL 4 CHAMBER: 73.6
EJECTION FRACTION: 73 %
LEFT ATRIUM VOLUME AREA LENGTH INDEX BSA: 28.6 ML/M2
LEFT VENTRICLE INTERNAL DIMENSION DIASTOLE: 3.42 CM (ref 3.5–6)
LEFT VENTRICULAR OUTFLOW TRACT DIAMETER: 2.1 CM
MITRAL VALVE E/A RATIO: 0.61
RIGHT VENTRICLE FREE WALL PEAK S': 1.8 CM/S
RIGHT VENTRICLE PEAK SYSTOLIC PRESSURE: 24.2 MMHG
TRICUSPID ANNULAR PLANE SYSTOLIC EXCURSION: 2.8 CM

## 2025-02-18 PROCEDURE — 93306 TTE W/DOPPLER COMPLETE: CPT | Performed by: INTERNAL MEDICINE

## 2025-02-18 PROCEDURE — 93306 TTE W/DOPPLER COMPLETE: CPT

## 2025-02-20 ENCOUNTER — OFFICE VISIT (OUTPATIENT)
Dept: CARDIOLOGY | Facility: CLINIC | Age: 75
End: 2025-02-20
Payer: MEDICARE

## 2025-02-20 VITALS
DIASTOLIC BLOOD PRESSURE: 80 MMHG | HEART RATE: 73 BPM | BODY MASS INDEX: 27.53 KG/M2 | WEIGHT: 149.6 LBS | HEIGHT: 62 IN | OXYGEN SATURATION: 98 % | SYSTOLIC BLOOD PRESSURE: 140 MMHG

## 2025-02-20 DIAGNOSIS — I25.10 ATHSCL HEART DISEASE OF NATIVE CORONARY ARTERY W/O ANG PCTRS: ICD-10-CM

## 2025-02-20 DIAGNOSIS — E78.5 HYPERLIPIDEMIA, UNSPECIFIED HYPERLIPIDEMIA TYPE: ICD-10-CM

## 2025-02-20 DIAGNOSIS — Z98.61 HISTORY OF PERCUTANEOUS CORONARY INTERVENTION: ICD-10-CM

## 2025-02-20 DIAGNOSIS — I10 HTN (HYPERTENSION), BENIGN: ICD-10-CM

## 2025-02-20 DIAGNOSIS — I42.2 HYPERTROPHIC CARDIOMYOPATHY (MULTI): Primary | ICD-10-CM

## 2025-02-20 DIAGNOSIS — I25.2 HISTORY OF MYOCARDIAL INFARCTION: ICD-10-CM

## 2025-02-20 DIAGNOSIS — I25.2 H/O MYOCARDIAL INFARCTION, GREATER THAN 8 WEEKS: ICD-10-CM

## 2025-02-20 PROBLEM — M54.50 LUMBAR SPINE PAIN: Status: RESOLVED | Noted: 2024-08-22 | Resolved: 2025-02-20

## 2025-02-20 PROBLEM — M75.40 IMPINGEMENT SYNDROME OF SHOULDER REGION: Status: RESOLVED | Noted: 2024-01-31 | Resolved: 2025-02-20

## 2025-02-20 LAB
ATRIAL RATE: 60 BPM
P AXIS: 47 DEGREES
P OFFSET: 194 MS
P ONSET: 146 MS
PR INTERVAL: 164 MS
Q ONSET: 228 MS
QRS COUNT: 10 BEATS
QRS DURATION: 74 MS
QT INTERVAL: 408 MS
QTC CALCULATION(BAZETT): 408 MS
QTC FREDERICIA: 408 MS
R AXIS: -26 DEGREES
T AXIS: 46 DEGREES
T OFFSET: 432 MS
VENTRICULAR RATE: 60 BPM

## 2025-02-20 PROCEDURE — 1160F RVW MEDS BY RX/DR IN RCRD: CPT | Performed by: INTERNAL MEDICINE

## 2025-02-20 PROCEDURE — 99214 OFFICE O/P EST MOD 30 MIN: CPT | Mod: 25 | Performed by: INTERNAL MEDICINE

## 2025-02-20 PROCEDURE — 3077F SYST BP >= 140 MM HG: CPT | Performed by: INTERNAL MEDICINE

## 2025-02-20 PROCEDURE — 99214 OFFICE O/P EST MOD 30 MIN: CPT | Performed by: INTERNAL MEDICINE

## 2025-02-20 PROCEDURE — 93010 ELECTROCARDIOGRAM REPORT: CPT | Performed by: INTERNAL MEDICINE

## 2025-02-20 PROCEDURE — 93005 ELECTROCARDIOGRAM TRACING: CPT | Performed by: INTERNAL MEDICINE

## 2025-02-20 PROCEDURE — 3079F DIAST BP 80-89 MM HG: CPT | Performed by: INTERNAL MEDICINE

## 2025-02-20 PROCEDURE — 1159F MED LIST DOCD IN RCRD: CPT | Performed by: INTERNAL MEDICINE

## 2025-02-20 PROCEDURE — 1036F TOBACCO NON-USER: CPT | Performed by: INTERNAL MEDICINE

## 2025-02-20 PROCEDURE — 1123F ACP DISCUSS/DSCN MKR DOCD: CPT | Performed by: INTERNAL MEDICINE

## 2025-02-20 PROCEDURE — 1157F ADVNC CARE PLAN IN RCRD: CPT | Performed by: INTERNAL MEDICINE

## 2025-02-20 PROCEDURE — 3008F BODY MASS INDEX DOCD: CPT | Performed by: INTERNAL MEDICINE

## 2025-02-20 NOTE — PROGRESS NOTES
"  Subjective  Marisela Fernández  is a 74 y.o. year old female who presents for F/U hypertrophic cardiomyopathy.  Gets fullness in chest with anxiety.  She is arguing a lot with her daughter    Blood pressure (!) 160/98, pulse 73, height 1.575 m (5' 2\"), weight 67.9 kg (149 lb 9.6 oz), SpO2 98%.   Codeine  History reviewed. No pertinent past medical history.  Past Surgical History:   Procedure Laterality Date    COLONOSCOPY  10/22/2015    Complete Colonoscopy    HYSTERECTOMY  04/07/2015    Hysterectomy    OTHER SURGICAL HISTORY  04/07/2015    Corneal LASIK Bilateral    OTHER SURGICAL HISTORY  04/07/2015    Transcath Placement Of Intrathoracic Carotid Artery Stent    OTHER SURGICAL HISTORY  05/19/2020    Previous Stent Placement    TONSILLECTOMY  02/13/2018    Tonsillectomy With Adenoidectomy     No family history on file.  @[facility]    Current Outpatient Medications   Medication Sig Dispense Refill    alendronate (Fosamax) 70 mg tablet Take 1 tablet (70 mg) by mouth 1 (one) time per week. 12 tablet 0    aspirin 81 mg EC tablet Take 1 tablet (81 mg) by mouth once daily.      glucosamine sulfate 500 mg capsule Take 1 capsule by mouth 2 times a day.      lisinopril 2.5 mg tablet TAKE ONE TABLET BY MOUTH ONCE DAILY 90 tablet 0    metoprolol succinate XL (Toprol-XL) 25 mg 24 hr tablet TAKE ONE-HALF TABLET BY MOUTH TWICE DAILY 90 tablet 3    omega-3 fatty acids-fish oil 300-1,000 mg capsule Take by mouth.      simvastatin (Zocor) 80 mg tablet TAKE 1/2 TABLET BY MOUTH DAILY 45 tablet 3    vit A/vit C/vit E/zinc/copper (PRESERVISION AREDS ORAL) Take by mouth.       No current facility-administered medications for this visit.        ROS  Review of Systems   All other systems reviewed and are negative.      Physical Exam  Physical Exam  Constitutional:       Appearance: Normal appearance.   HENT:      Head: Normocephalic and atraumatic.   Cardiovascular:      Rate and Rhythm: Normal rate and regular rhythm.      Heart sounds: " Murmur heard.      Crescendo decrescendo systolic murmur is present with a grade of 1/6.   Pulmonary:      Effort: Pulmonary effort is normal.      Breath sounds: Normal breath sounds.   Abdominal:      General: Abdomen is flat.   Musculoskeletal:      Right lower leg: No edema.      Left lower leg: No edema.   Skin:     General: Skin is warm and dry.   Neurological:      General: No focal deficit present.      Mental Status: She is alert and oriented to person, place, and time.   Psychiatric:         Mood and Affect: Mood normal.         Behavior: Behavior normal.          EKG  No results found for this or any previous visit (from the past 4464 hours).    Problem List Items Addressed This Visit       Athscl heart disease of native coronary artery w/o ang pctrs    HTN (hypertension), benign    Hyperlipidemia     1.31.25 Tchol = 170, HDL = 56, LDL = 80, Non HDL = 114         History of myocardial infarction    Hypertrophic cardiomyopathy (Multi) - Primary     2/18/25 echocardiogram severe septal with moderate posterior wal increased thicknwess, no significant LVOT gradient, LVEF = 70-75%,, decreased LV size, TR with normal RVSP, moderately dilate IVC         Relevant Orders    ECG 12 Lead    RESOLVED: History of percutaneous coronary intervention     Other Visit Diagnoses       H/O myocardial infarction, greater than 8 weeks                  Same meds  Return 6 months with EKG and echocadiogram a few days prior to return      Julius Gatica MD

## 2025-02-20 NOTE — ASSESSMENT & PLAN NOTE
2/18/25 echocardiogram severe septal with moderate posterior wal increased thicknwess, no significant LVOT gradient, LVEF = 70-75%,, decreased LV size, TR with normal RVSP, moderately dilate IVC

## 2025-03-25 DIAGNOSIS — M81.0 AGE-RELATED OSTEOPOROSIS WITHOUT CURRENT PATHOLOGICAL FRACTURE: ICD-10-CM

## 2025-03-25 RX ORDER — COVID-19 VACCINE, MRNA 0.04 MG/.418ML
INJECTION, SUSPENSION INTRAMUSCULAR
COMMUNITY
Start: 2024-09-20

## 2025-03-25 RX ORDER — POLYETHYLENE GLYCOL-3350 AND ELECTROLYTES 236; 6.74; 5.86; 2.97; 22.74 G/274.31G; G/274.31G; G/274.31G; G/274.31G; G/274.31G
POWDER, FOR SOLUTION ORAL
COMMUNITY
Start: 2024-08-30

## 2025-03-25 RX ORDER — AMOXICILLIN 500 MG/1
TABLET, FILM COATED ORAL
COMMUNITY
Start: 2024-11-05

## 2025-03-25 RX ORDER — ALENDRONATE SODIUM 70 MG/1
70 TABLET ORAL
Qty: 12 TABLET | Refills: 0 | Status: SHIPPED | OUTPATIENT
Start: 2025-03-25

## 2025-03-25 RX ORDER — CHOLECALCIFEROL (VITAMIN D3) 25 MCG
1 TABLET ORAL DAILY
COMMUNITY

## 2025-03-25 RX ORDER — METHYLPREDNISOLONE 4 MG/1
TABLET ORAL
COMMUNITY
Start: 2024-08-13

## 2025-03-25 RX ORDER — AZITHROMYCIN 250 MG/1
TABLET, FILM COATED ORAL
COMMUNITY
Start: 2024-05-11

## 2025-03-25 RX ORDER — INFLUENZA A VIRUS A/VICTORIA/4897/2022 IVR-238 (H1N1) ANTIGEN (FORMALDEHYDE INACTIVATED), INFLUENZA A VIRUS A/CALIFORNIA/122/2022 SAN-022 (H3N2) ANTIGEN (FORMALDEHYDE INACTIVATED), AND INFLUENZA B VIRUS B/MICHIGAN/01/2021 ANTIGEN (FORMALDEHYDE INACTIVATED) 60; 60; 60 UG/.5ML; UG/.5ML; UG/.5ML
INJECTION, SUSPENSION INTRAMUSCULAR
COMMUNITY
Start: 2024-09-20

## 2025-03-26 DIAGNOSIS — I25.10 ATHSCL HEART DISEASE OF NATIVE CORONARY ARTERY W/O ANG PCTRS: ICD-10-CM

## 2025-03-26 DIAGNOSIS — I10 HTN (HYPERTENSION), BENIGN: ICD-10-CM

## 2025-03-26 RX ORDER — METOPROLOL SUCCINATE 25 MG/1
12.5 TABLET, EXTENDED RELEASE ORAL 2 TIMES DAILY
Qty: 90 TABLET | Refills: 3 | Status: SHIPPED | OUTPATIENT
Start: 2025-03-26 | End: 2025-03-27 | Stop reason: SDUPTHER

## 2025-03-26 RX ORDER — SIMVASTATIN 80 MG/1
40 TABLET, FILM COATED ORAL DAILY
Qty: 45 TABLET | Refills: 3 | Status: SHIPPED | OUTPATIENT
Start: 2025-03-26 | End: 2025-03-27 | Stop reason: SDUPTHER

## 2025-03-27 DIAGNOSIS — I25.10 ATHSCL HEART DISEASE OF NATIVE CORONARY ARTERY W/O ANG PCTRS: ICD-10-CM

## 2025-03-27 DIAGNOSIS — I10 HTN (HYPERTENSION), BENIGN: Primary | ICD-10-CM

## 2025-03-27 RX ORDER — SIMVASTATIN 80 MG/1
40 TABLET, FILM COATED ORAL DAILY
Qty: 45 TABLET | Refills: 3 | Status: SHIPPED | OUTPATIENT
Start: 2025-03-27

## 2025-03-27 RX ORDER — METOPROLOL SUCCINATE 25 MG/1
12.5 TABLET, EXTENDED RELEASE ORAL 2 TIMES DAILY
Qty: 90 TABLET | Refills: 3 | Status: SHIPPED | OUTPATIENT
Start: 2025-03-27

## 2025-05-24 DIAGNOSIS — I10 HTN (HYPERTENSION), BENIGN: ICD-10-CM

## 2025-05-27 RX ORDER — LISINOPRIL 2.5 MG/1
2.5 TABLET ORAL DAILY
Qty: 90 TABLET | Refills: 3 | Status: SHIPPED | OUTPATIENT
Start: 2025-05-27 | End: 2025-05-28 | Stop reason: SDUPTHER

## 2025-05-28 DIAGNOSIS — I10 HTN (HYPERTENSION), BENIGN: ICD-10-CM

## 2025-05-28 RX ORDER — LISINOPRIL 2.5 MG/1
2.5 TABLET ORAL DAILY
Qty: 90 TABLET | Refills: 3 | Status: SHIPPED | OUTPATIENT
Start: 2025-05-28

## 2025-06-10 DIAGNOSIS — M81.0 AGE-RELATED OSTEOPOROSIS WITHOUT CURRENT PATHOLOGICAL FRACTURE: ICD-10-CM

## 2025-06-10 RX ORDER — ALENDRONATE SODIUM 70 MG/1
70 TABLET ORAL
Qty: 12 TABLET | Refills: 0 | Status: SHIPPED | OUTPATIENT
Start: 2025-06-10

## 2025-07-31 ENCOUNTER — PATIENT MESSAGE (OUTPATIENT)
Dept: CARDIOLOGY | Facility: CLINIC | Age: 75
End: 2025-07-31
Payer: MEDICARE

## 2025-07-31 DIAGNOSIS — I25.10 ATHSCL HEART DISEASE OF NATIVE CORONARY ARTERY W/O ANG PCTRS: ICD-10-CM

## 2025-07-31 DIAGNOSIS — E78.5 HYPERLIPIDEMIA, UNSPECIFIED HYPERLIPIDEMIA TYPE: ICD-10-CM

## 2025-07-31 DIAGNOSIS — I10 HTN (HYPERTENSION), BENIGN: ICD-10-CM

## 2025-08-10 LAB
ALBUMIN SERPL-MCNC: 4.6 G/DL (ref 3.6–5.1)
ALP SERPL-CCNC: 51 U/L (ref 37–153)
ALT SERPL-CCNC: 13 U/L (ref 6–29)
ANION GAP SERPL CALCULATED.4IONS-SCNC: 9 MMOL/L (CALC) (ref 7–17)
AST SERPL-CCNC: 16 U/L (ref 10–35)
BILIRUB SERPL-MCNC: 1.4 MG/DL (ref 0.2–1.2)
BUN SERPL-MCNC: 16 MG/DL (ref 7–25)
CALCIUM SERPL-MCNC: 10.1 MG/DL (ref 8.6–10.4)
CHLORIDE SERPL-SCNC: 104 MMOL/L (ref 98–110)
CHOLEST SERPL-MCNC: 162 MG/DL
CHOLEST/HDLC SERPL: 2.8 (CALC)
CO2 SERPL-SCNC: 30 MMOL/L (ref 20–32)
CREAT SERPL-MCNC: 0.78 MG/DL (ref 0.6–1)
EGFRCR SERPLBLD CKD-EPI 2021: 79 ML/MIN/1.73M2
ERYTHROCYTE [DISTWIDTH] IN BLOOD BY AUTOMATED COUNT: 13.2 % (ref 11–15)
GLUCOSE SERPL-MCNC: 108 MG/DL (ref 65–99)
HCT VFR BLD AUTO: 46.5 % (ref 35–45)
HDLC SERPL-MCNC: 58 MG/DL
HGB BLD-MCNC: 14.9 G/DL (ref 11.7–15.5)
LDLC SERPL CALC-MCNC: 71 MG/DL (CALC)
MCH RBC QN AUTO: 30.7 PG (ref 27–33)
MCHC RBC AUTO-ENTMCNC: 32 G/DL (ref 32–36)
MCV RBC AUTO: 95.9 FL (ref 80–100)
NONHDLC SERPL-MCNC: 104 MG/DL (CALC)
PLATELET # BLD AUTO: 202 THOUSAND/UL (ref 140–400)
PMV BLD REES-ECKER: 9.5 FL (ref 7.5–12.5)
POTASSIUM SERPL-SCNC: 4.2 MMOL/L (ref 3.5–5.3)
PROT SERPL-MCNC: 6.7 G/DL (ref 6.1–8.1)
RBC # BLD AUTO: 4.85 MILLION/UL (ref 3.8–5.1)
SODIUM SERPL-SCNC: 143 MMOL/L (ref 135–146)
TRIGL SERPL-MCNC: 241 MG/DL
WBC # BLD AUTO: 6.4 THOUSAND/UL (ref 3.8–10.8)

## 2025-08-14 ENCOUNTER — APPOINTMENT (OUTPATIENT)
Dept: PRIMARY CARE | Facility: CLINIC | Age: 75
End: 2025-08-14
Payer: MEDICARE

## 2025-08-19 ENCOUNTER — HOSPITAL ENCOUNTER (OUTPATIENT)
Dept: CARDIOLOGY | Facility: CLINIC | Age: 75
Discharge: HOME | End: 2025-08-19
Payer: MEDICARE

## 2025-08-19 DIAGNOSIS — I25.2 H/O MYOCARDIAL INFARCTION, GREATER THAN 8 WEEKS: ICD-10-CM

## 2025-08-19 DIAGNOSIS — I42.2 OTHER HYPERTROPHIC CARDIOMYOPATHY (MULTI): ICD-10-CM

## 2025-08-19 DIAGNOSIS — I25.10 ATHSCL HEART DISEASE OF NATIVE CORONARY ARTERY W/O ANG PCTRS: ICD-10-CM

## 2025-08-19 LAB
AORTIC VALVE MEAN GRADIENT: 3 MMHG
AORTIC VALVE PEAK VELOCITY: 1.27 M/S
AV PEAK GRADIENT: 6 MMHG
AVA (PEAK VEL): 2.8 CM2
AVA (VTI): 3.07 CM2
EJECTION FRACTION APICAL 4 CHAMBER: 69.2
EJECTION FRACTION: 68 %
LEFT ATRIUM VOLUME AREA LENGTH INDEX BSA: 25.2 ML/M2
LEFT VENTRICLE INTERNAL DIMENSION DIASTOLE: 4.22 CM (ref 3.5–6)
LEFT VENTRICULAR OUTFLOW TRACT DIAMETER: 2.16 CM
MITRAL VALVE E/A RATIO: 0.6
RIGHT VENTRICLE FREE WALL PEAK S': 0.17 CM/S
RIGHT VENTRICLE PEAK SYSTOLIC PRESSURE: 24 MMHG
TRICUSPID ANNULAR PLANE SYSTOLIC EXCURSION: 1.6 CM

## 2025-08-19 PROCEDURE — 93306 TTE W/DOPPLER COMPLETE: CPT

## 2025-08-19 PROCEDURE — 93306 TTE W/DOPPLER COMPLETE: CPT | Performed by: INTERNAL MEDICINE

## 2025-08-26 ENCOUNTER — APPOINTMENT (OUTPATIENT)
Dept: CARDIOLOGY | Facility: CLINIC | Age: 75
End: 2025-08-26
Payer: MEDICARE

## 2025-08-27 ENCOUNTER — APPOINTMENT (OUTPATIENT)
Dept: CARDIOLOGY | Facility: CLINIC | Age: 75
End: 2025-08-27
Payer: MEDICARE

## 2025-08-27 VITALS
SYSTOLIC BLOOD PRESSURE: 126 MMHG | OXYGEN SATURATION: 95 % | BODY MASS INDEX: 27.79 KG/M2 | HEIGHT: 62 IN | WEIGHT: 151 LBS | DIASTOLIC BLOOD PRESSURE: 78 MMHG | HEART RATE: 67 BPM

## 2025-08-27 DIAGNOSIS — I25.2 H/O MYOCARDIAL INFARCTION, GREATER THAN 8 WEEKS: ICD-10-CM

## 2025-08-27 DIAGNOSIS — Z96.652 H/O TOTAL KNEE REPLACEMENT, LEFT: ICD-10-CM

## 2025-08-27 DIAGNOSIS — I42.2 HYPERTROPHIC CARDIOMYOPATHY (MULTI): Primary | ICD-10-CM

## 2025-08-27 DIAGNOSIS — I25.2 HISTORY OF MYOCARDIAL INFARCTION: ICD-10-CM

## 2025-08-27 DIAGNOSIS — I10 HTN (HYPERTENSION), BENIGN: ICD-10-CM

## 2025-08-27 DIAGNOSIS — I25.10 ATHSCL HEART DISEASE OF NATIVE CORONARY ARTERY W/O ANG PCTRS: ICD-10-CM

## 2025-08-27 DIAGNOSIS — E78.5 HYPERLIPIDEMIA, UNSPECIFIED HYPERLIPIDEMIA TYPE: ICD-10-CM

## 2025-08-27 PROBLEM — M81.0 OSTEOPOROSIS: Status: RESOLVED | Noted: 2023-07-25 | Resolved: 2025-08-27

## 2025-08-27 PROBLEM — M85.80 OSTEOPENIA: Status: ACTIVE | Noted: 2025-08-27

## 2025-08-27 LAB
ATRIAL RATE: 57 BPM
P AXIS: 54 DEGREES
P OFFSET: 176 MS
P ONSET: 128 MS
PR INTERVAL: 170 MS
Q ONSET: 213 MS
QRS COUNT: 9 BEATS
QRS DURATION: 80 MS
QT INTERVAL: 434 MS
QTC CALCULATION(BAZETT): 422 MS
QTC FREDERICIA: 426 MS
R AXIS: -31 DEGREES
T AXIS: 43 DEGREES
T OFFSET: 430 MS
VENTRICULAR RATE: 57 BPM

## 2025-08-27 PROCEDURE — 99213 OFFICE O/P EST LOW 20 MIN: CPT | Mod: 25

## 2025-08-27 PROCEDURE — 3078F DIAST BP <80 MM HG: CPT | Performed by: INTERNAL MEDICINE

## 2025-08-27 PROCEDURE — 99214 OFFICE O/P EST MOD 30 MIN: CPT | Performed by: INTERNAL MEDICINE

## 2025-08-27 PROCEDURE — 93005 ELECTROCARDIOGRAM TRACING: CPT | Performed by: INTERNAL MEDICINE

## 2025-08-27 PROCEDURE — 1159F MED LIST DOCD IN RCRD: CPT | Performed by: INTERNAL MEDICINE

## 2025-08-27 PROCEDURE — 3074F SYST BP LT 130 MM HG: CPT | Performed by: INTERNAL MEDICINE

## 2025-08-27 PROCEDURE — 1160F RVW MEDS BY RX/DR IN RCRD: CPT | Performed by: INTERNAL MEDICINE

## 2025-08-27 PROCEDURE — 93010 ELECTROCARDIOGRAM REPORT: CPT | Performed by: INTERNAL MEDICINE

## 2025-08-27 PROCEDURE — 1036F TOBACCO NON-USER: CPT | Performed by: INTERNAL MEDICINE

## 2025-08-28 ENCOUNTER — APPOINTMENT (OUTPATIENT)
Dept: PRIMARY CARE | Facility: CLINIC | Age: 75
End: 2025-08-28
Payer: MEDICARE

## 2025-08-29 DIAGNOSIS — M81.0 AGE-RELATED OSTEOPOROSIS WITHOUT CURRENT PATHOLOGICAL FRACTURE: ICD-10-CM

## 2025-08-29 RX ORDER — ALENDRONATE SODIUM 70 MG/1
70 TABLET ORAL
Qty: 12 TABLET | Refills: 0 | Status: SHIPPED | OUTPATIENT
Start: 2025-08-29

## 2025-10-28 ENCOUNTER — APPOINTMENT (OUTPATIENT)
Dept: PRIMARY CARE | Facility: CLINIC | Age: 75
End: 2025-10-28
Payer: MEDICARE

## 2026-02-18 ENCOUNTER — APPOINTMENT (OUTPATIENT)
Dept: CARDIOLOGY | Facility: CLINIC | Age: 76
End: 2026-02-18
Payer: MEDICARE